# Patient Record
Sex: MALE | Race: WHITE | HISPANIC OR LATINO | Employment: FULL TIME | URBAN - METROPOLITAN AREA
[De-identification: names, ages, dates, MRNs, and addresses within clinical notes are randomized per-mention and may not be internally consistent; named-entity substitution may affect disease eponyms.]

---

## 2017-08-10 ENCOUNTER — OFFICE VISIT (OUTPATIENT)
Dept: URGENT CARE | Facility: CLINIC | Age: 22
End: 2017-08-10

## 2017-08-10 VITALS
SYSTOLIC BLOOD PRESSURE: 128 MMHG | BODY MASS INDEX: 25.11 KG/M2 | DIASTOLIC BLOOD PRESSURE: 82 MMHG | WEIGHT: 160 LBS | RESPIRATION RATE: 19 BRPM | TEMPERATURE: 98 F | HEIGHT: 67 IN | HEART RATE: 64 BPM | OXYGEN SATURATION: 99 %

## 2017-08-10 DIAGNOSIS — S61.311A: Primary | ICD-10-CM

## 2017-08-10 PROCEDURE — 17250 CHEM CAUT OF GRANLTJ TISSUE: CPT | Mod: S$GLB,,, | Performed by: PHYSICIAN ASSISTANT

## 2017-08-10 PROCEDURE — 99203 OFFICE O/P NEW LOW 30 MIN: CPT | Mod: S$GLB,,, | Performed by: PHYSICIAN ASSISTANT

## 2017-08-10 PROCEDURE — 3008F BODY MASS INDEX DOCD: CPT | Mod: S$GLB,,, | Performed by: PHYSICIAN ASSISTANT

## 2017-08-10 RX ORDER — MUPIROCIN 20 MG/G
OINTMENT TOPICAL
Qty: 22 G | Refills: 1 | Status: SHIPPED | OUTPATIENT
Start: 2017-08-10

## 2017-08-10 NOTE — PATIENT INSTRUCTIONS
Laceración, Yfn, Sin Sutura [Laceration: Old, Not Sutured]  Suzanne laceración es suzanne cortada a través de la piel. Si la cortada es profunda, generalmente requerirá puntos. Sin embargo, si suzanne laceración permanece abierta demasiado tiempo, aumenta el riesgo de infección. En novoa enrique, valenzuela pasado demasiado tiempo antes de venir a tratamiento. El peligro de infección por la sutura en ambar momento es muy alto. Por marianela razón novoa herida no recibió puntos.  Si la herida está completamente abierta se curará rellenándose desde el fondo y los lados. Suzanne herida sin sutura puede llevar de suzanne a cuatro semanas para curarse, dependiendo del tamaño de la herida. Probablemente quedará suzanne cicatriz. Más adelante, podrá hablar sobre un procedimiento para disimular la cicatriz con novoa proveedor de atención médica.  Cuidado En [place]  Los siguientes lineamientos lo ayudarán a cuidar de novoa laceración en novoa casa:  · Mantenga novoa herida limpia y seca. Si le aplicaron suzanne venda y se moja o ensucia, reemplácela. De otro modo, déjela en novoa lugar las primeras 24 horas, luego cámbielas suzanne vez al día elyse le indicaron.  · Limpie la herida diariamente:  ¨ Después de quitar la venda, lave la kathleen con agua y jabón. Use un hisopo de algodón (Q-tip) para aflojar y quitar la alan seca o las costras en la herida.  ¨ Hable con novoa médico antes de colocarse pomada antibiótica en la herida. Coloque suzanne nueva venda.  ¨ Puede quitarse la venda para ducharse después de 24 horas, andreas no sumerja la kathleen en agua (no se bañe ni nade) carson los próximos gume días. Evite las actividades que puedan hacer que se le vuelva a abrir la herida.  · El médico puede recetarle suzanne crema o pomada antibiótica para evitar suzanne infección. No deje de usarla hasta terminar el tratamiento indicado o hasta que el médico le diga que deje de usarla.  · El médico puede recetarle medicamentos para el dolor. Tómelos de acuerdo con las indicaciones del médico.  · Revise la herida a  diario para [place] si hay alguna de las señales de infección que figuran abajo.  Seguimiento  Programe abigail visita de control con novoa proveedor de atención médica según le haya indicado.  Busque Atención Médica  Llame de inmediato a novoa proveedor de atención médica si algo de lo siguiente ocurre:  · Sangrado que no para ejerciendo presión directa sobre la herida  · Señales de infección, incluidos aumento del dolor en la herida, enrojecimiento, hinchazón o pus o mal olor procedentes de la herida  · Fiebre de 100.4°F (38°C) o más fatuma, o elyse le haya indicado novoa proveedor de atención médica  · Los bordes de la herida se abren  · La herida cambia de color  · Adormecimiento alrededor de la herida  · Disminución de la movilidad en la kathleen herida  Date Last Reviewed: 6/10/2015  © 5612-9370 [employer]. [address] [place] [id number]. Todos los derechos reservados. Esta información no pretende sustituir la atención médica profesional. Sólo novoa médico puede diagnosticar y tratar un problema de ailin.        Amputación De La Punta Del Dedo [Finger Tip Amputation, Open Treatment]  Usted ha sufrido abigail amputación parcial o completa de la punta del dedo (la falange distal). Para novoa tipo de lesión, el mejor resultado puede obtenerse permitiendo que la herida se cure por sí twan a medida que la piel crece por ambos lados. Según cuál sea el tamaño de la herida, esta tardará 2-6 semanas en cubrirse de piel nueva. Abigail vez curada la herida, usted recuperará la sensibilidad normal en la nueva piel.  Cuidados En [place]:  Las siguientes recomendaciones le ayudarán a cuidar novoa herida en casa:  · Si se usó algún tipo de anestesia en el dedo, novoa efecto desaparecerá gradualmente en 1-6 horas. Luego, tome cualquier medicamento contra el dolor que le hayan recetado. Si no le recetaron nada, puede jennifer un analgésico de venta jennifer.  · Mantenga elevada la mano lesionada carson los primeros dos días para reducir la hinchazón  y el dolor.  · Mantenga el vendaje limpio y seco. Si el vendaje permanece puesto carson más de dos días, se pegará a la herida. A menos que tenga abigail raffaele con el médico en dos días, cambie el vendaje según se describe a continuación.  · Si le recetaron algún medicamento para evitar infecciones, tómelo de acuerdo con las indicaciones hasta que se acabe o le indiquen que deje de tomarlo.  · Si el vendaje se pega a la herida, puede despegarlo mojándolo dave con agua tibia.  · Abigail vez que haya quitado el vendaje, lave la herida con agua y jabón. Luego aplique abigail pomada antibiótica.  · [place] los primeros dos días debe usarse un vendaje o apósito grueso de gasa para gadiel protección adicional contra las lesiones. Cubra la herida con un abigail almohadilla de gasa no adhesiva o envuélvala con abigail venda de gasa. Después, si la herida es pequeña y no le duele demasiado, puede usar abigail venda larga y elástica.  · Puede ducharse elyse de costumbre, andreas mantenga el vendaje seco cubriendo la mano con abigail bolsa de plástico pequeña ajustada con abigail martin elástica alrededor de la daivd mientras se ducha. No moje la mano en agua (bañeras o piscinas) hasta que el proveedor de atención médica le diga que puede hacerlo.  Visitas De Control:  Programe abigail visita de control con novoa proveedor de atención médica según le haya indicado.  Busque Atención Médica  Llame de inmediato a novoa proveedor de atención médica si algo de lo siguiente ocurre:  · Sangrado que no puede controlarse aplicando presión directa  · Aumento del dolor en la herida  · Enrojecimiento o hinchazón en la kathleen de la herida  · Pus o supuración procedentes de la herida, o mal olor   · Fiebre de 100.4°F (38°C) o más fatuma, o elyse le haya indicado novoa proveedor de atención médica.  Date Last Reviewed: 6/14/2015 © 2000-2016 [email or web address] [address] [place] [address], [place] [address]. Todos los derechos reservados. Esta información no pretende sustituir la atención médica  profesional. Sólo novoa médico puede diagnosticar y tratar un problema de ailin.      Please follow up with your Primary care provider within 2-5 days if your signs ans symptoms have not resolved or worsen.     If your condition worsens or fails to improve we recommend that you receive another evaluation at the emergency room immediately or contact your primary medical clinic to discuss your concerns.   You must understand that you have received an Urgent Care treatment only and that you may be released before all of your medical problems are known or treated. You, the patient, will arrange for follow up care as instructed.                                                             Laceration Repair   If your condition worsens or fails to improve we recommend that you receive another evaluation at the ER immediately or contact your PCP to discuss your concerns or return here. You must understand that you've received an urgent care treatment only and that you may be released before all your medical problems are known or treated. You the patient will arrange for followup care as instructed.   Use the prescription antibiotic ointment for 2-3 days only. Clean the wound twice a day with betadiene and apply the ointment. After that, only use a dry bandage as the ointment will keep the laceration too moist.   Suture removal on face in 5 days   Suture removal on trunk or extremities in about 10 days.   Monitor for signs of infection such as redness, drainage, increase swelling or increase pain.   If you were given narcotics for pain do not drive or operate heavy equipment or machinery.   Tylenol or ibuprofen can also be used as directed for pain unless you have an allergy to them or medical condition such as stomach ulcers, kidney or liver disease or blood thinners etc for which you should not be taking these type of medications.

## 2017-08-10 NOTE — PROGRESS NOTES
"Subjective:       Patient ID: Rex Dumont is a 21 y.o. male.    Vitals:  height is 5' 7" (1.702 m) and weight is 72.6 kg (160 lb). His temperature is 97.9 °F (36.6 °C). His blood pressure is 128/82 and his pulse is 64. His respiration is 19 and oxygen saturation is 99%.     Chief Complaint: Laceration    Laceration    The incident occurred 6 to 12 hours ago. The laceration is located on the left hand. The laceration mechanism was a clean knife. The pain is at a severity of 0/10. The patient is experiencing no pain. He reports no foreign bodies present.     Review of Systems   Constitution: Negative for weakness and malaise/fatigue.   HENT: Negative for nosebleeds.    Cardiovascular: Negative for chest pain and syncope.   Respiratory: Negative for shortness of breath.    Hematologic/Lymphatic: Positive for bleeding problem.   Musculoskeletal: Negative for back pain, joint pain and neck pain.   Gastrointestinal: Negative for abdominal pain.   Genitourinary: Negative for hematuria.   Neurological: Negative for dizziness and numbness.        Pt has a headache       Objective:      Physical Exam   Constitutional: He is oriented to person, place, and time. He appears well-developed and well-nourished.   HENT:   Head: Normocephalic and atraumatic. Head is without abrasion, without contusion and without laceration.   Right Ear: External ear normal.   Left Ear: External ear normal.   Nose: Nose normal.   Mouth/Throat: Oropharynx is clear and moist.   Eyes: Conjunctivae, EOM and lids are normal. Pupils are equal, round, and reactive to light.   Neck: Trachea normal, full passive range of motion without pain and phonation normal. Neck supple.   Cardiovascular: Normal rate, regular rhythm and normal heart sounds.    Pulmonary/Chest: Effort normal and breath sounds normal. No stridor. No respiratory distress.   Musculoskeletal: Normal range of motion.   Neurological: He is alert and oriented to person, place, and time. "   Skin: Skin is warm and dry. Capillary refill takes less than 2 seconds. Laceration noted. No abrasion, no bruising, no burn, no ecchymosis, no lesion and no rash noted. No erythema.        Psychiatric: He has a normal mood and affect. His speech is normal and behavior is normal. Judgment and thought content normal. Cognition and memory are normal.   Nursing note and vitals reviewed.      Assessment:       1. Laceration of left index finger w/o foreign body with damage to nail        Plan:         Laceration of left index finger w/o foreign body with damage to nail                                                              Laceration Repair   If your condition worsens or fails to improve we recommend that you receive another evaluation at the ER immediately or contact your PCP to discuss your concerns or return here. You must understand that you've received an urgent care treatment only and that you may be released before all your medical problems are known or treated. You the patient will arrange for followup care as instructed.   Use the prescription antibiotic ointment for 2-3 days only. Clean the wound twice a day with betadiene and apply the ointment. After that, only use a dry bandage as the ointment will keep the laceration too moist.   Suture removal on face in 5 days   Suture removal on trunk or extremities in about 10 days.   Monitor for signs of infection such as redness, drainage, increase swelling or increase pain.   If you were given narcotics for pain do not drive or operate heavy equipment or machinery.   Tylenol or ibuprofen can also be used as directed for pain unless you have an allergy to them or medical condition such as stomach ulcers, kidney or liver disease or blood thinners etc for which you should not be taking these type of medications.

## 2017-08-10 NOTE — PROCEDURES
Procedures     Wound Repair     Verbal consent obtained  Prior to procedure correct patient, procedure, and site verfied.  Location: Left index phalynx medial tip  Simple partial amp about 2mmx 3mm  Contamination: none   Foreign bodies:None  No tendon involvement  No nerve involvement  Actively bleeding    Procedure details:  Irrigated with Saline 80 mLs  Sterile fashion utilized.   Local anesthesia: Topical lidocaine 1% without epi used.   Prepped with betadine  Chem cautery used with silver nitrate  Blood loss in 1 mLs  Cleansed and bandaged.  Pt tolerated procedure well.

## 2019-06-27 ENCOUNTER — OFFICE VISIT (OUTPATIENT)
Dept: URGENT CARE | Facility: CLINIC | Age: 24
End: 2019-06-27

## 2019-06-27 VITALS
DIASTOLIC BLOOD PRESSURE: 83 MMHG | OXYGEN SATURATION: 100 % | HEIGHT: 67 IN | TEMPERATURE: 98 F | BODY MASS INDEX: 28.56 KG/M2 | SYSTOLIC BLOOD PRESSURE: 122 MMHG | HEART RATE: 58 BPM | RESPIRATION RATE: 18 BRPM | WEIGHT: 182 LBS

## 2019-06-27 DIAGNOSIS — R10.31 RIGHT LOWER QUADRANT ABDOMINAL PAIN: Primary | ICD-10-CM

## 2019-06-27 PROCEDURE — 99214 PR OFFICE/OUTPT VISIT, EST, LEVL IV, 30-39 MIN: ICD-10-PCS | Mod: S$GLB,,, | Performed by: NURSE PRACTITIONER

## 2019-06-27 PROCEDURE — 74019 RADEX ABDOMEN 2 VIEWS: CPT | Mod: FY,TIER,S$GLB, | Performed by: RADIOLOGY

## 2019-06-27 PROCEDURE — 99214 OFFICE O/P EST MOD 30 MIN: CPT | Mod: S$GLB,,, | Performed by: NURSE PRACTITIONER

## 2019-06-27 PROCEDURE — 74019 XR ABDOMEN FLAT AND ERECT: ICD-10-PCS | Mod: FY,TIER,S$GLB, | Performed by: RADIOLOGY

## 2019-06-27 NOTE — PROGRESS NOTES
"Subjective:       Patient ID: Rex Dumont is a 23 y.o. male.    Vitals:  height is 5' 7" (1.702 m) and weight is 82.6 kg (182 lb). His oral temperature is 97.5 °F (36.4 °C). His blood pressure is 122/83 and his pulse is 58 (abnormal). His respiration is 18 and oxygen saturation is 100%.     Chief Complaint: Flank Pain (right side)    Abdominal Pain   This is a new problem. Episode onset: 1 week. The onset quality is sudden. The problem occurs intermittently. The problem has been waxing and waning. The pain is located in the RLQ. The pain is at a severity of 6/10. The pain is moderate. The quality of the pain is sharp (palpation and walking hurts). The abdominal pain does not radiate. Associated symptoms include myalgias. Pertinent negatives include no arthralgias, diarrhea, frequency, nausea or vomiting. The pain is aggravated by palpation and movement. He has tried nothing for the symptoms.       Constitution: Negative for chills and fatigue.   HENT: Negative for congestion and sore throat.    Neck: Negative for painful lymph nodes.   Cardiovascular: Negative for leg swelling.   Eyes: Negative for double vision and blurred vision.   Respiratory: Negative for cough and shortness of breath.    Gastrointestinal: Positive for abdominal pain. Negative for nausea, vomiting and diarrhea.   Genitourinary: Negative for frequency and urgency.   Musculoskeletal: Positive for muscle ache. Negative for joint pain, joint swelling and muscle cramps.   Skin: Negative for color change, pale and rash.   Allergic/Immunologic: Negative for seasonal allergies.   Neurological: Negative for dizziness, history of vertigo, light-headedness and passing out.   Hematologic/Lymphatic: Negative for swollen lymph nodes, easy bruising/bleeding and history of blood clots. Does not bruise/bleed easily.   Psychiatric/Behavioral: Negative for nervous/anxious, sleep disturbance and depression. The patient is not nervous/anxious.      "   Objective:      Physical Exam   Constitutional: He is oriented to person, place, and time. Vital signs are normal. He appears well-developed and well-nourished. He is active and cooperative.  Non-toxic appearance. He does not have a sickly appearance. He does not appear ill. No distress.   HENT:   Head: Normocephalic and atraumatic.   Right Ear: External ear normal.   Left Ear: External ear normal.   Nose: Nose normal.   Mouth/Throat: Mucous membranes are normal.   Eyes: Conjunctivae, EOM and lids are normal.   Neck: Trachea normal and full passive range of motion without pain. Neck supple.   Cardiovascular: Normal rate, regular rhythm and normal heart sounds.   Pulses:       Radial pulses are 2+ on the right side, and 2+ on the left side.   Pulmonary/Chest: Effort normal and breath sounds normal. No respiratory distress.   Abdominal: Soft. Normal appearance and bowel sounds are normal. He exhibits no distension, no abdominal bruit, no pulsatile midline mass and no mass. There is tenderness (mild) in the right lower quadrant. There is no rigidity, no rebound, no guarding, no CVA tenderness, no tenderness at McBurney's point and negative Wong's sign.       Musculoskeletal: Normal range of motion. He exhibits no edema.   Neurological: He is alert and oriented to person, place, and time. He has normal strength. Gait normal.   Skin: Skin is warm, dry and intact. Capillary refill takes less than 2 seconds. No rash noted. He is not diaphoretic. No pallor.   Psychiatric: He has a normal mood and affect. His speech is normal and behavior is normal. Judgment and thought content normal. Cognition and memory are normal.   Nursing note and vitals reviewed.      Assessment:       1. Right lower quadrant abdominal pain        Plan:       Abdomen soft with very mild tenderness noted to the right lower quadrant.  Bowel sounds active in all 4 quadrants.  Patient reports pain exacerbate when he strains while trying to have a  bowel movement. Pain noted to be intermittent.  Advised patient to stop straining.  Increase fiber in diet.  May take laxative over-the-counter as needed for constipation.  Abdominal ER precautions discussed.  Advised to follow-up primary care provider for further evaluation.    Right lower quadrant abdominal pain  -     XR ABDOMEN FLAT AND ERECT; Future; Expected date: 06/27/2019      Patient Instructions   May take laxative over-the-counter as needed for constipation such as MiraLax or magnesium citrate.    Go to the Emergency Department for any new or worsening symptoms including: worsening abdominal pain, dark\black\bloody bowel movements, vomiting blood, hard abdomen, fever, chest pain, shortness of breath, loss of consciousness or any other concerns.    If you were prescribed a narcotic or controlled medication, do not drive or operate heavy equipment or machinery while taking these medications.    You must understand that you've received an Urgent Care treatment only and that you may be released before all your medical problems are known or treated. You, the patient, will arrange for follow up care as instructed.    Follow up with your PCP or specialty clinic as directed within 2-5 days if not improved or as needed.  You can call [phone] to schedule an appointment with the appropriate provider.      Dolor Abdominalcausa Desconocida [Male]  De acuerdo a novoa visita hoy, no es maria alejandra la causa exacta del dolor abdominal que usted tiene. Novoa condición no parece ser seria en ambar momento. Sin embargo, las señales de abigail condición más seria pueden jennifer más tiempo en aparecer. Aunque la evaluación fue tranquilizadora hoy, a veces en el curso de muchas enfermedades, exámenes y pruebas de laboratorio pueden parecer normales. Por esta razón, es importante que usted esté pendiente de nuevos síntomas o si novoa condición se empeora.  Causas  : Puede que no sea obvio lo que causó bertha síntomas. Preste atención a las cosas  que parecen hacer que bertha síntomas empeoren o mejoren y consulte con novoa médico para novoa seguimiento.  Diagnóstico  : La evaluación del dolor abdominal en el departamento de emergencia sólo puede requerir un examen por el médico, o puede incluir análisis de alan, orina o estudios de imagen, dependiendo de muchos factores. A veces, los exámenes y las pruebas pueden identificar abigail causa, andreas en muchos casos, no se encuentra abigail causa maria alejandra. Más pruebas en las visitas de seguimiento pueden ayudar a sugerir un diagnóstico kenn.  Cuidado En [place]:  · Descanse hasta que se sienta mejor.  · Trate de evitar cualquier medicamento (a menos que se lo diga novoa médico), comidas, actividades, u otros factores que pueden bryce contribuido a los síntomas.  · Trate de comer alimentos que usted sabe que valenzuela tolerado dave en el pasado. Algunas dietas pueden ser recomendados para algunas condiciones que causan dolor abdominal. Sin embargo, puesto que la causa de bertha síntomas pueden no ser jude, sobre novoa dieta más con novoa médico de cabecera o especialista para obtener más recomendaciones.  · [place] varias comidas pequeñas al día en lugar de 2 o 3 comidas grandes pueden ayudar.  · Observe con cuidado cualquier cosa que pueda hacer que bertha síntomas empeoren o mejores. Preste mucha atención a los siguientes síntomas que pueden indicar empeoramiento de novoa condición.  Seguimiento Y Precauciones:  consulte con novoa doctor o en esta institución según las instrucciones dadas si bertha síntomas no mejoran. En algunos casos, es posible que necesite más pruebas.  Comuníquese Con Novoa Médico O Busque Prontamente Atención Médica  si algo de lo siguiente ocurre:  · El dolor aumenta  · No puede jennifer bertha medicamentos debido al vómito excesivo  · Hinchazón del abdomen.  · Fiebre de 100.4°F (38°C) o más fatuma, o elyse le haya indicado novoa proveedor de atención médica  · Alan en el vómito o en la materia fecal (de color negruzco o rojizo oscuro).  · Debilidad,  mareo o benitezo.  · Dolor en el pecho, brazo, espalda, robin o mandíbula  Date Last Reviewed: 12/30/2015  © 5520-6767 [facility]. [address] [place] [address], [place] [address]. Todos los derechos reservados. Esta información no pretende sustituir la atención médica profesional. Sólo novoa médico puede diagnosticar y tratar un problema de ailin.        High-Fiber Diet  Fiber is in fruits, vegetables, cereals, and grains. Fiber passes through your body undigested. A high-fiber diet helps food move through your intestinal tract. The added bulk is helpful in preventing constipation. In people with diverticulosis, fiber helps clean out the pouches along the colon wall. It also prevents new pouches from forming. A high-fiber diet reduces the risk of colon cancer. It also lowers blood cholesterol and prevents high blood sugar in people with diabetes.    The fiber-rich foods listed below should be part of your diet. If you are not used to high-fiber foods, start with 1 or 2 foods from this list. Every 3 to 4 days add a new one to your diet. Do this until you are eating 4 high-fiber foods per day. This should give you 20 to 35 grams of fiber a day. It is also important to drink a lot of water when you are on this diet. You should have 6 to 8 glasses of water a day. Water makes the fiber swell and increases the benefit.  Foods high in dietary fiber  The following foods are high in dietary fiber:  · Breads. Breads made with 100% whole-wheat flour; kae, wheat, or rye crackers; whole-grain tortillas, bran muffins.  · Cereals. Whole-grain and bran cereals with bran (shredded wheat, wheat flakes, raisin bran, corn bran); oatmeal, rolled oats, granola, and brown rice.  · Fruits. Fresh fruits and their edible skins (pears, prunes, raisins, berries, apples, and apricots); bananas, citrus fruit, mangoes, pineapple; and prune juice.  · Nuts. Any nuts and seeds.  · Vegetables. Best served raw or lightly cooked. All types,  especially: green peas, celery, eggplant, potatoes, spinach, broccoli, [place] sprouts, winter squash, carrots, cauliflower, soybeans, lentils, and fresh and dried beans of all kinds.  · Other. Popcorn, any spices.  Date Last Reviewed: 8/1/2016 © 2000-2017 [employer]. [address] [place] [address]. All rights reserved. This information is not intended as a substitute for professional medical care. Always follow your healthcare professional's instructions.        Dolor Abdominalcausa Desconocida [Male]  De acuerdo a novoa visita hoy, no es maria alejandra la causa exacta del dolor abdominal que usted tiene. Novoa condición no parece ser seria en ambar momento. Sin embargo, las señales de abigail condición más seria pueden jennifer más tiempo en aparecer. Aunque la evaluación fue tranquilizadora holópez, a veces en el curso de muchas enfermedades, exámenes y pruebas de laboratorio pueden parecer normales. Por esta razón, es importante que usted esté pendiente de nuevos síntomas o si novoa condición se empeora.  Causas  : Puede que no sea obvio lo que causó bertha síntomas. Preste atención a las cosas que parecen hacer que bertha síntomas empeoren o mejoren y consulte con novoa médico para novoa seguimiento.  Diagnóstico  : La evaluación del dolor abdominal en el departamento de emergencia sólo puede requerir un examen por el médico, o puede incluir análisis de alan, orina o estudios de imagen, dependiendo de muchos factores. A veces, los exámenes y las pruebas pueden identificar abigail causa, andreas en muchos casos, no se encuentra abigail causa maria alejandra. Más pruebas en las visitas de seguimiento pueden ayudar a sugerir un diagnóstico kenn.  Cuidado En [place]:  · Descanse hasta que se sienta mejor.  · Trate de evitar cualquier medicamento (a menos que se lo diga novoa médico), comidas, actividades, u otros factores que pueden bryce contribuido a los síntomas.  · Trate de comer alimentos que usted sabe que valenzuela tolerado dave en el pasado. Algunas  dietas pueden ser recomendados para algunas condiciones que causan dolor abdominal. Sin embargo, puesto que la causa de bertha síntomas pueden no ser jude, sobre novoa dieta más con novoa médico de cabecera o especialista para obtener más recomendaciones.  · [place] varias comidas pequeñas al día en lugar de 2 o 3 comidas grandes pueden ayudar.  · Observe con cuidado cualquier cosa que pueda hacer que bertha síntomas empeoren o mejores. Preste mucha atención a los siguientes síntomas que pueden indicar empeoramiento de novoa condición.  Seguimiento Y Precauciones:  consulte con novoa doctor o en esta institución según las instrucciones dadas si bertha síntomas no mejoran. En algunos casos, es posible que necesite más pruebas.  Comuníquese Con Novoa Médico O Busque Prontamente Atención Médica  si algo de lo siguiente ocurre:  · El dolor aumenta  · No puede jennifer bertha medicamentos debido al vómito excesivo  · Hinchazón del abdomen.  · Fiebre de 100.4°F (38°C) o más fatuma, o elyse le haya indicado novoa proveedor de atención médica  · [place] en el vómito o en la materia fecal (de color negruzco o rojizo oscuro).  · Debilidad, mareo o desmayo.  · Dolor en el pecho, brazo, espalda, robin o mandíbula  Date Last Reviewed: 12/30/2015  © 3489-5850 [employer]. [address] [place] [address]. Todos los derechos reservados. Esta información no pretende sustituir la atención médica profesional. Sólo novoa médico puede diagnosticar y tratar un problema de ailin.        High-Fiber Diet  Fiber is in fruits, vegetables, cereals, and grains. Fiber passes through your body undigested. A high-fiber diet helps food move through your intestinal tract. The added bulk is helpful in preventing constipation. In people with diverticulosis, fiber helps clean out the pouches along the colon wall. It also prevents new pouches from forming. A high-fiber diet reduces the risk of colon cancer. It also lowers blood cholesterol and prevents high blood  sugar in people with diabetes.    The fiber-rich foods listed below should be part of your diet. If you are not used to high-fiber foods, start with 1 or 2 foods from this list. Every 3 to 4 days add a new one to your diet. Do this until you are eating 4 high-fiber foods per day. This should give you 20 to 35 grams of fiber a day. It is also important to drink a lot of water when you are on this diet. You should have 6 to 8 glasses of water a day. Water makes the fiber swell and increases the benefit.  Foods high in dietary fiber  The following foods are high in dietary fiber:  · Breads. Breads made with 100% whole-wheat flour; kae, wheat, or rye crackers; whole-grain tortillas, bran muffins.  · Cereals. Whole-grain and bran cereals with bran (shredded wheat, wheat flakes, raisin bran, corn bran); oatmeal, rolled oats, granola, and brown rice.  · Fruits. Fresh fruits and their edible skins (pears, prunes, raisins, berries, apples, and apricots); bananas, citrus fruit, mangoes, pineapple; and prune juice.  · Nuts. Any nuts and seeds.  · Vegetables. Best served raw or lightly cooked. All types, especially: green peas, celery, eggplant, potatoes, spinach, broccoli, [place] sprouts, winter squash, carrots, cauliflower, soybeans, lentils, and fresh and dried beans of all kinds.  · Other. Popcorn, any spices.  Date Last Reviewed: 8/1/2016  © 3279-7940 [employer]. [address] [place] [address], [place] [address]. All rights reserved. This information is not intended as a substitute for professional medical care. Always follow your healthcare professional's instructions.        Treating Constipation    Constipation is a common and often uncomfortable problem. Constipation means you have bowel movements fewer than 3 times per week, or strain to pass hard, dry stool. It can last a short time. Or it can be a problem that never seems to go away. The good news is that it can often be treated and controlled.  Eat more  fiber  One of the best ways to help treat constipation is to increase your fiber intake. You can do this either through diet or by using fiber supplements. Fiber (in whole grains, fruits, and vegetables) adds bulk and absorbs water to soften the stool. This helps the stool pass through the colon more easily. When you increase your fiber intake, do it slowly to avoid side effects such as bloating. Also increase the amount of water that you drink. Eating more of the following foods can add fiber to your diet.  · High-fiber cereals  · Whole grains, bran, and brown rice  · Vegetables such as carrots, broccoli, and greens  · Fresh fruits (especially apples, pears, and dried fruits like raisins and apricots)  · Nuts and legumes (especially beans such as lentils, kidney beans, and lima beans)  Get physically active  Exercise helps improve the working of your colon which helps ease constipation. Try to get some physical activity every day. If you havent been active for a while, talk to your healthcare provider before starting again.  Laxatives  Your healthcare provider may suggest an over-the-counter product to help ease your constipation. He or she may suggest the use of bulk-forming agents or laxatives. The use of laxatives, if used as directed, is common and safe. Follow directions carefully when using them. See your healthcare provider for new-onset constipation, or long-term constipation, to rule out other causes such as medicines or thyroid disease.  Date Last Reviewed: 7/1/2016  © 6331-1830 [employer], [employer]. [address] [place] [address], [place] [address]. All rights reserved. This information is not intended as a substitute for professional medical care. Always follow your healthcare professional's instructions.

## 2019-06-27 NOTE — PATIENT INSTRUCTIONS
May take laxative over-the-counter as needed for constipation such as MiraLax or magnesium citrate.    Go to the Emergency Department for any new or worsening symptoms including: worsening abdominal pain, dark\black\bloody bowel movements, vomiting blood, hard abdomen, fever, chest pain, shortness of breath, loss of consciousness or any other concerns.    If you were prescribed a narcotic or controlled medication, do not drive or operate heavy equipment or machinery while taking these medications.    You must understand that you've received an Urgent Care treatment only and that you may be released before all your medical problems are known or treated. You, the patient, will arrange for follow up care as instructed.    Follow up with your PCP or specialty clinic as directed within 2-5 days if not improved or as needed.  You can call [phone] to schedule an appointment with the appropriate provider.      Dolor Abdominalcausa Desconocida [Male]  De acuerdo a novoa visita hoy, no es maria alejandra la causa exacta del dolor abdominal que usted tiene. Novoa condición no parece ser seria en ambar momento. Sin embargo, las señales de abigail condición más seria pueden jennifer más tiempo en aparecer. Aunque la evaluación fue tranquilizadora hoy, a veces en el curso de muchas enfermedades, exámenes y pruebas de laboratorio pueden parecer normales. Por esta razón, es importante que usted esté pendiente de nuevos síntomas o si novoa condición se empeora.  Causas  : Puede que no sea obvio lo que causó bertha síntomas. Preste atención a las cosas que parecen hacer que bertha síntomas empeoren o mejoren y consulte con novoa médico para novoa seguimiento.  Diagnóstico  : La evaluación del dolor abdominal en el departamento de emergencia sólo puede requerir un examen por el médico, o puede incluir análisis de alan, orina o estudios de imagen, dependiendo de muchos factores. A veces, los exámenes y las pruebas pueden identificar abigail causa, andreas en muchos casos,  no se encuentra abigail causa maria alejandra. Más pruebas en las visitas de seguimiento pueden ayudar a sugerir un diagnóstico kenn.  Cuidado En [place]:  · Descanse hasta que se sienta mejor.  · Trate de evitar cualquier medicamento (a menos que se lo diga novoa médico), comidas, actividades, u otros factores que pueden bryce contribuido a los síntomas.  · Trate de comer alimentos que usted sabe que valenzuela tolerado dave en el pasado. Algunas dietas pueden ser recomendados para algunas condiciones que causan dolor abdominal. Sin embargo, puesto que la causa de bertha síntomas pueden no ser jude, sobre novoa dieta más con novoa médico de cabecera o especialista para obtener más recomendaciones.  · [place] varias comidas pequeñas al día en lugar de 2 o 3 comidas grandes pueden ayudar.  · Observe con cuidado cualquier cosa que pueda hacer que bertha síntomas empeoren o mejores. Preste mucha atención a los siguientes síntomas que pueden indicar empeoramiento de novoa condición.  Seguimiento Y Precauciones:  consulte con novoa doctor o en esta institución según las instrucciones dadas si bertha síntomas no mejoran. En algunos casos, es posible que necesite más pruebas.  Comuníquese Con Novoa Médico O Busque Prontamente Atención Médica  si algo de lo siguiente ocurre:  · El dolor aumenta  · No puede jennifer bertha medicamentos debido al vómito excesivo  · Hinchazón del abdomen.  · Fiebre de 100.4°F (38°C) o más fatuma, o elyse le haya indicado novoa proveedor de atención médica  · Tripp en el vómito o en la materia fecal (de color negruzco o rojizo oscuro).  · Debilidad, mareo o desmayo.  · Dolor en el pecho, mars, paradise, robin o shiva  Date Last Reviewed: 12/30/2015  © 4419-1995 [employer]. [address] [place] [address]. Todos los derechos reservados. Esta información no pretende sustituir la atención médica profesional. Sólo novoa médico puede diagnosticar y tratar un problema de ailin.        High-Fiber Diet  Fiber is in fruits, vegetables,  cereals, and grains. Fiber passes through your body undigested. A high-fiber diet helps food move through your intestinal tract. The added bulk is helpful in preventing constipation. In people with diverticulosis, fiber helps clean out the pouches along the colon wall. It also prevents new pouches from forming. A high-fiber diet reduces the risk of colon cancer. It also lowers blood cholesterol and prevents high blood sugar in people with diabetes.    The fiber-rich foods listed below should be part of your diet. If you are not used to high-fiber foods, start with 1 or 2 foods from this list. Every 3 to 4 days add a new one to your diet. Do this until you are eating 4 high-fiber foods per day. This should give you 20 to 35 grams of fiber a day. It is also important to drink a lot of water when you are on this diet. You should have 6 to 8 glasses of water a day. Water makes the fiber swell and increases the benefit.  Foods high in dietary fiber  The following foods are high in dietary fiber:  · Breads. Breads made with 100% whole-wheat flour; kae, wheat, or rye crackers; whole-grain tortillas, bran muffins.  · Cereals. Whole-grain and bran cereals with bran (shredded wheat, wheat flakes, raisin bran, corn bran); oatmeal, rolled oats, granola, and brown rice.  · Fruits. Fresh fruits and their edible skins (pears, prunes, raisins, berries, apples, and apricots); bananas, citrus fruit, mangoes, pineapple; and prune juice.  · Nuts. Any nuts and seeds.  · Vegetables. Best served raw or lightly cooked. All types, especially: green peas, celery, eggplant, potatoes, spinach, broccoli, [place] sprouts, winter squash, carrots, cauliflower, soybeans, lentils, and fresh and dried beans of all kinds.  · Other. Popcorn, any spices.  Date Last Reviewed: 8/1/2016  © 6952-2519 [employer]. [address] [place] [address], [place] [address]. All rights reserved. This information is not intended as a substitute for  professional medical care. Always follow your healthcare professional's instructions.        Dolor Abdominalcausa Desconocida [Male]  De acuerdo a novoa visita holópez, no es maria alejandra la causa exacta del dolor abdominal que usted tiene. Novoa condición no parece ser seria en ambar momento. Sin embargo, las señales de abigail condición más seria pueden jennifer más tiempo en aparecer. Aunque la evaluación fue tranquilizadora stephan, a veces en el curso de muchas enfermedades, exámenes y pruebas de laboratorio pueden parecer normales. Por esta razón, es importante que usted esté pendiente de nuevos síntomas o si novoa condición se empeora.  Causas  : Puede que no sea obvio lo que causó bertha síntomas. Preste atención a las cosas que parecen hacer que bertha síntomas empeoren o mejoren y consulte con novoa médico para novoa seguimiento.  Diagnóstico  : La evaluación del dolor abdominal en el departamento de emergencia sólo puede requerir un examen por el médico, o puede incluir análisis de alan, orina o estudios de imagen, dependiendo de muchos factores. A veces, los exámenes y las pruebas pueden identificar abigail causa, andreas en muchos casos, no se encuentra abigail causa maria alejandra. Más pruebas en las visitas de seguimiento pueden ayudar a sugerir un diagnóstico kenn.  Cuidado En [place]:  · Descanse hasta que se sienta mejor.  · Trate de evitar cualquier medicamento (a menos que se lo diga novoa médico), comidas, actividades, u otros factores que pueden bryce contribuido a los síntomas.  · Trate de comer alimentos que usted sabe que valenzuela tolerado dave en el pasado. Algunas dietas pueden ser recomendados para algunas condiciones que causan dolor abdominal. Sin embargo, puesto que la causa de bertha síntomas pueden no ser jude, sobre novoa dieta más con novoa médico de cabecera o especialista para obtener más recomendaciones.  · [place] varias comidas pequeñas al día en lugar de 2 o 3 comidas grandes pueden ayudar.  · Observe con cuidado cualquier cosa que pueda hacer que bertha  síntomas empeoren o mejores. Preste mucha atención a los siguientes síntomas que pueden indicar empeoramiento de novoa condición.  Seguimiento Y Precauciones:  consulte con novoa doctor o en esta institución según las instrucciones dadas si bertha síntomas no mejoran. En algunos casos, es posible que necesite más pruebas.  Comuníquese Con Novoa Médico O Busque Prontamente Atención Médica  si algo de lo siguiente ocurre:  · El dolor aumenta  · No puede jennifer bertha medicamentos debido al vómito excesivo  · Hinchazón del abdomen.  · Fiebre de 100.4°F (38°C) o más fatuma, o elyse le haya indicado novoa proveedor de atención médica  · Tripp en el vómito o en la materia fecal (de color negruzco o rojizo oscuro).  · Debilidad, mareo o desmayo.  · Dolor en el pecho, brazo, espalda, robin o mandíbula  Date Last Reviewed: 12/30/2015  © 1871-8463 [employer]. [address] [place] [address]. Todos los derechos reservados. Esta información no pretende sustituir la atención médica profesional. Sólo novoa médico puede diagnosticar y tratar un problema de ailin.        High-Fiber Diet  Fiber is in fruits, vegetables, cereals, and grains. Fiber passes through your body undigested. A high-fiber diet helps food move through your intestinal tract. The added bulk is helpful in preventing constipation. In people with diverticulosis, fiber helps clean out the pouches along the colon wall. It also prevents new pouches from forming. A high-fiber diet reduces the risk of colon cancer. It also lowers blood cholesterol and prevents high blood sugar in people with diabetes.    The fiber-rich foods listed below should be part of your diet. If you are not used to high-fiber foods, start with 1 or 2 foods from this list. Every 3 to 4 days add a new one to your diet. Do this until you are eating 4 high-fiber foods per day. This should give you 20 to 35 grams of fiber a day. It is also important to drink a lot of water when you are on this diet.  You should have 6 to 8 glasses of water a day. Water makes the fiber swell and increases the benefit.  Foods high in dietary fiber  The following foods are high in dietary fiber:  · Breads. Breads made with 100% whole-wheat flour; kae, wheat, or rye crackers; whole-grain tortillas, bran muffins.  · Cereals. Whole-grain and bran cereals with bran (shredded wheat, wheat flakes, raisin bran, corn bran); oatmeal, rolled oats, granola, and brown rice.  · Fruits. Fresh fruits and their edible skins (pears, prunes, raisins, berries, apples, and apricots); bananas, citrus fruit, mangoes, pineapple; and prune juice.  · Nuts. Any nuts and seeds.  · Vegetables. Best served raw or lightly cooked. All types, especially: green peas, celery, eggplant, potatoes, spinach, broccoli, [place] sprouts, winter squash, carrots, cauliflower, soybeans, lentils, and fresh and dried beans of all kinds.  · Other. Popcorn, any spices.  Date Last Reviewed: 8/1/2016  © 8782-2678 [employer]. [address] [place] [address], [place] [address]. All rights reserved. This information is not intended as a substitute for professional medical care. Always follow your healthcare professional's instructions.        Treating Constipation    Constipation is a common and often uncomfortable problem. Constipation means you have bowel movements fewer than 3 times per week, or strain to pass hard, dry stool. It can last a short time. Or it can be a problem that never seems to go away. The good news is that it can often be treated and controlled.  Eat more fiber  One of the best ways to help treat constipation is to increase your fiber intake. You can do this either through diet or by using fiber supplements. Fiber (in whole grains, fruits, and vegetables) adds bulk and absorbs water to soften the stool. This helps the stool pass through the colon more easily. When you increase your fiber intake, do it slowly to avoid side effects such as bloating. Also  increase the amount of water that you drink. Eating more of the following foods can add fiber to your diet.  · High-fiber cereals  · Whole grains, bran, and brown rice  · Vegetables such as carrots, broccoli, and greens  · Fresh fruits (especially apples, pears, and dried fruits like raisins and apricots)  · Nuts and legumes (especially beans such as lentils, kidney beans, and lima beans)  Get physically active  Exercise helps improve the working of your colon which helps ease constipation. Try to get some physical activity every day. If you havent been active for a while, talk to your healthcare provider before starting again.  Laxatives  Your healthcare provider may suggest an over-the-counter product to help ease your constipation. He or she may suggest the use of bulk-forming agents or laxatives. The use of laxatives, if used as directed, is common and safe. Follow directions carefully when using them. See your healthcare provider for new-onset constipation, or long-term constipation, to rule out other causes such as medicines or thyroid disease.  Date Last Reviewed: 7/1/2016 © 2000-2017 [email or web address]. [address] [place] [address], [place] [address]. All rights reserved. This information is not intended as a substitute for professional medical care. Always follow your healthcare professional's instructions.

## 2019-06-30 ENCOUNTER — TELEPHONE (OUTPATIENT)
Dept: URGENT CARE | Facility: CLINIC | Age: 24
End: 2019-06-30

## 2024-06-11 ENCOUNTER — ANESTHESIA EVENT (OUTPATIENT)
Dept: ENDOSCOPY | Facility: HOSPITAL | Age: 29
End: 2024-06-11

## 2024-06-11 ENCOUNTER — ANESTHESIA (OUTPATIENT)
Dept: ENDOSCOPY | Facility: HOSPITAL | Age: 29
End: 2024-06-11

## 2024-06-11 ENCOUNTER — HOSPITAL ENCOUNTER (INPATIENT)
Facility: HOSPITAL | Age: 29
LOS: 1 days | Discharge: HOME | DRG: 378 | End: 2024-06-12
Attending: STUDENT IN AN ORGANIZED HEALTH CARE EDUCATION/TRAINING PROGRAM | Admitting: STUDENT IN AN ORGANIZED HEALTH CARE EDUCATION/TRAINING PROGRAM

## 2024-06-11 DIAGNOSIS — R07.9 CHEST PAIN: ICD-10-CM

## 2024-06-11 DIAGNOSIS — K92.2 UPPER GI BLEED: ICD-10-CM

## 2024-06-11 PROBLEM — D62 ANEMIA DUE TO ACUTE BLOOD LOSS: Status: ACTIVE | Noted: 2024-06-11

## 2024-06-11 LAB
ABO + RH BLD: NORMAL
ALBUMIN SERPL BCP-MCNC: 2.9 G/DL (ref 3.5–5.2)
ALP SERPL-CCNC: 40 U/L (ref 55–135)
ALT SERPL W/O P-5'-P-CCNC: 9 U/L (ref 10–44)
ANION GAP SERPL CALC-SCNC: 7 MMOL/L (ref 8–16)
AST SERPL-CCNC: 12 U/L (ref 10–40)
BILIRUB SERPL-MCNC: 0.8 MG/DL (ref 0.1–1)
BLD GP AB SCN CELLS X3 SERPL QL: NORMAL
BLD PROD TYP BPU: NORMAL
BLOOD UNIT EXPIRATION DATE: NORMAL
BLOOD UNIT TYPE CODE: 5100
BLOOD UNIT TYPE: NORMAL
BUN SERPL-MCNC: 8 MG/DL (ref 6–20)
CALCIUM SERPL-MCNC: 8.1 MG/DL (ref 8.7–10.5)
CHLORIDE SERPL-SCNC: 108 MMOL/L (ref 95–110)
CO2 SERPL-SCNC: 22 MMOL/L (ref 23–29)
CODING SYSTEM: NORMAL
CREAT SERPL-MCNC: 0.9 MG/DL (ref 0.5–1.4)
CROSSMATCH INTERPRETATION: NORMAL
DISPENSE STATUS: NORMAL
ERYTHROCYTE [DISTWIDTH] IN BLOOD BY AUTOMATED COUNT: 17.2 % (ref 11.5–14.5)
EST. GFR  (NO RACE VARIABLE): >60 ML/MIN/1.73 M^2
GLUCOSE SERPL-MCNC: 142 MG/DL (ref 70–110)
HCT VFR BLD AUTO: 19.4 % (ref 40–54)
HGB BLD-MCNC: 6.5 G/DL (ref 14–18)
HGB BLD-MCNC: 7.4 G/DL (ref 14–18)
INR PPP: 1 (ref 0.8–1.2)
MAGNESIUM SERPL-MCNC: 1.8 MG/DL (ref 1.6–2.6)
MCH RBC QN AUTO: 30.5 PG (ref 27–31)
MCHC RBC AUTO-ENTMCNC: 33.5 G/DL (ref 32–36)
MCV RBC AUTO: 91 FL (ref 82–98)
PLATELET # BLD AUTO: 214 K/UL (ref 150–450)
PMV BLD AUTO: 10.5 FL (ref 9.2–12.9)
POTASSIUM SERPL-SCNC: 4.2 MMOL/L (ref 3.5–5.1)
PROT SERPL-MCNC: 4.8 G/DL (ref 6–8.4)
PROTHROMBIN TIME: 10.9 SEC (ref 9–12.5)
RBC # BLD AUTO: 2.13 M/UL (ref 4.6–6.2)
SODIUM SERPL-SCNC: 137 MMOL/L (ref 136–145)
SPECIMEN OUTDATE: NORMAL
TRANS ERYTHROCYTES VOL PATIENT: NORMAL ML
WBC # BLD AUTO: 14.73 K/UL (ref 3.9–12.7)

## 2024-06-11 PROCEDURE — 85018 HEMOGLOBIN: CPT | Performed by: INTERNAL MEDICINE

## 2024-06-11 PROCEDURE — 86900 BLOOD TYPING SEROLOGIC ABO: CPT | Performed by: STUDENT IN AN ORGANIZED HEALTH CARE EDUCATION/TRAINING PROGRAM

## 2024-06-11 PROCEDURE — 30233N1 TRANSFUSION OF NONAUTOLOGOUS RED BLOOD CELLS INTO PERIPHERAL VEIN, PERCUTANEOUS APPROACH: ICD-10-PCS | Performed by: INTERNAL MEDICINE

## 2024-06-11 PROCEDURE — D9220A PRA ANESTHESIA: Mod: ,,, | Performed by: STUDENT IN AN ORGANIZED HEALTH CARE EDUCATION/TRAINING PROGRAM

## 2024-06-11 PROCEDURE — 27201012 HC FORCEPS, HOT/COLD, DISP: Performed by: INTERNAL MEDICINE

## 2024-06-11 PROCEDURE — 37000008 HC ANESTHESIA 1ST 15 MINUTES: Performed by: INTERNAL MEDICINE

## 2024-06-11 PROCEDURE — 80053 COMPREHEN METABOLIC PANEL: CPT | Performed by: STUDENT IN AN ORGANIZED HEALTH CARE EDUCATION/TRAINING PROGRAM

## 2024-06-11 PROCEDURE — 63600175 PHARM REV CODE 636 W HCPCS: Performed by: STUDENT IN AN ORGANIZED HEALTH CARE EDUCATION/TRAINING PROGRAM

## 2024-06-11 PROCEDURE — 43239 EGD BIOPSY SINGLE/MULTIPLE: CPT | Performed by: INTERNAL MEDICINE

## 2024-06-11 PROCEDURE — 85027 COMPLETE CBC AUTOMATED: CPT | Performed by: STUDENT IN AN ORGANIZED HEALTH CARE EDUCATION/TRAINING PROGRAM

## 2024-06-11 PROCEDURE — 86850 RBC ANTIBODY SCREEN: CPT | Performed by: STUDENT IN AN ORGANIZED HEALTH CARE EDUCATION/TRAINING PROGRAM

## 2024-06-11 PROCEDURE — 11000001 HC ACUTE MED/SURG PRIVATE ROOM

## 2024-06-11 PROCEDURE — 36415 COLL VENOUS BLD VENIPUNCTURE: CPT | Performed by: INTERNAL MEDICINE

## 2024-06-11 PROCEDURE — 37000009 HC ANESTHESIA EA ADD 15 MINS: Performed by: INTERNAL MEDICINE

## 2024-06-11 PROCEDURE — 83735 ASSAY OF MAGNESIUM: CPT | Performed by: STUDENT IN AN ORGANIZED HEALTH CARE EDUCATION/TRAINING PROGRAM

## 2024-06-11 PROCEDURE — 86920 COMPATIBILITY TEST SPIN: CPT | Performed by: STUDENT IN AN ORGANIZED HEALTH CARE EDUCATION/TRAINING PROGRAM

## 2024-06-11 PROCEDURE — 0DB68ZX EXCISION OF STOMACH, VIA NATURAL OR ARTIFICIAL OPENING ENDOSCOPIC, DIAGNOSTIC: ICD-10-PCS | Performed by: INTERNAL MEDICINE

## 2024-06-11 PROCEDURE — 25000003 PHARM REV CODE 250: Performed by: NURSE ANESTHETIST, CERTIFIED REGISTERED

## 2024-06-11 PROCEDURE — 63600175 PHARM REV CODE 636 W HCPCS: Performed by: NURSE ANESTHETIST, CERTIFIED REGISTERED

## 2024-06-11 PROCEDURE — C9113 INJ PANTOPRAZOLE SODIUM, VIA: HCPCS | Performed by: STUDENT IN AN ORGANIZED HEALTH CARE EDUCATION/TRAINING PROGRAM

## 2024-06-11 PROCEDURE — 43239 EGD BIOPSY SINGLE/MULTIPLE: CPT | Mod: ,,, | Performed by: INTERNAL MEDICINE

## 2024-06-11 PROCEDURE — 0DB58ZX EXCISION OF ESOPHAGUS, VIA NATURAL OR ARTIFICIAL OPENING ENDOSCOPIC, DIAGNOSTIC: ICD-10-PCS | Performed by: INTERNAL MEDICINE

## 2024-06-11 PROCEDURE — 88305 TISSUE EXAM BY PATHOLOGIST: CPT | Performed by: PATHOLOGY

## 2024-06-11 PROCEDURE — P9021 RED BLOOD CELLS UNIT: HCPCS | Performed by: STUDENT IN AN ORGANIZED HEALTH CARE EDUCATION/TRAINING PROGRAM

## 2024-06-11 PROCEDURE — 85610 PROTHROMBIN TIME: CPT | Performed by: STUDENT IN AN ORGANIZED HEALTH CARE EDUCATION/TRAINING PROGRAM

## 2024-06-11 PROCEDURE — 99223 1ST HOSP IP/OBS HIGH 75: CPT | Mod: 25,,, | Performed by: INTERNAL MEDICINE

## 2024-06-11 PROCEDURE — 36415 COLL VENOUS BLD VENIPUNCTURE: CPT | Performed by: STUDENT IN AN ORGANIZED HEALTH CARE EDUCATION/TRAINING PROGRAM

## 2024-06-11 PROCEDURE — 25000003 PHARM REV CODE 250: Performed by: STUDENT IN AN ORGANIZED HEALTH CARE EDUCATION/TRAINING PROGRAM

## 2024-06-11 PROCEDURE — 88305 TISSUE EXAM BY PATHOLOGIST: CPT | Mod: 26,,, | Performed by: PATHOLOGY

## 2024-06-11 PROCEDURE — 36430 TRANSFUSION BLD/BLD COMPNT: CPT

## 2024-06-11 RX ORDER — LIDOCAINE HYDROCHLORIDE 20 MG/ML
INJECTION INTRAVENOUS
Status: DISCONTINUED | OUTPATIENT
Start: 2024-06-11 | End: 2024-06-11

## 2024-06-11 RX ORDER — IBUPROFEN 200 MG
16 TABLET ORAL
Status: DISCONTINUED | OUTPATIENT
Start: 2024-06-11 | End: 2024-06-12 | Stop reason: HOSPADM

## 2024-06-11 RX ORDER — DEXMEDETOMIDINE HYDROCHLORIDE 100 UG/ML
INJECTION, SOLUTION INTRAVENOUS
Status: DISCONTINUED | OUTPATIENT
Start: 2024-06-11 | End: 2024-06-11

## 2024-06-11 RX ORDER — PROPOFOL 10 MG/ML
VIAL (ML) INTRAVENOUS CONTINUOUS PRN
Status: DISCONTINUED | OUTPATIENT
Start: 2024-06-11 | End: 2024-06-11

## 2024-06-11 RX ORDER — NALOXONE HCL 0.4 MG/ML
0.02 VIAL (ML) INJECTION
Status: DISCONTINUED | OUTPATIENT
Start: 2024-06-11 | End: 2024-06-12 | Stop reason: HOSPADM

## 2024-06-11 RX ORDER — SODIUM CHLORIDE, SODIUM LACTATE, POTASSIUM CHLORIDE, CALCIUM CHLORIDE 600; 310; 30; 20 MG/100ML; MG/100ML; MG/100ML; MG/100ML
INJECTION, SOLUTION INTRAVENOUS CONTINUOUS PRN
Status: DISCONTINUED | OUTPATIENT
Start: 2024-06-11 | End: 2024-06-11

## 2024-06-11 RX ORDER — HYDROCODONE BITARTRATE AND ACETAMINOPHEN 500; 5 MG/1; MG/1
TABLET ORAL
Status: DISCONTINUED | OUTPATIENT
Start: 2024-06-11 | End: 2024-06-12 | Stop reason: HOSPADM

## 2024-06-11 RX ORDER — PROPOFOL 10 MG/ML
VIAL (ML) INTRAVENOUS
Status: DISCONTINUED | OUTPATIENT
Start: 2024-06-11 | End: 2024-06-11

## 2024-06-11 RX ORDER — GLUCAGON 1 MG
1 KIT INJECTION
Status: DISCONTINUED | OUTPATIENT
Start: 2024-06-11 | End: 2024-06-12 | Stop reason: HOSPADM

## 2024-06-11 RX ORDER — MUPIROCIN 20 MG/G
OINTMENT TOPICAL 2 TIMES DAILY
Status: DISCONTINUED | OUTPATIENT
Start: 2024-06-11 | End: 2024-06-12 | Stop reason: HOSPADM

## 2024-06-11 RX ORDER — PANTOPRAZOLE SODIUM 40 MG/10ML
40 INJECTION, POWDER, LYOPHILIZED, FOR SOLUTION INTRAVENOUS 2 TIMES DAILY
Status: DISCONTINUED | OUTPATIENT
Start: 2024-06-11 | End: 2024-06-12

## 2024-06-11 RX ORDER — SODIUM CHLORIDE 0.9 % (FLUSH) 0.9 %
10 SYRINGE (ML) INJECTION EVERY 12 HOURS PRN
Status: DISCONTINUED | OUTPATIENT
Start: 2024-06-11 | End: 2024-06-12 | Stop reason: HOSPADM

## 2024-06-11 RX ORDER — IBUPROFEN 200 MG
24 TABLET ORAL
Status: DISCONTINUED | OUTPATIENT
Start: 2024-06-11 | End: 2024-06-12 | Stop reason: HOSPADM

## 2024-06-11 RX ADMIN — MUPIROCIN: 20 OINTMENT TOPICAL at 09:06

## 2024-06-11 RX ADMIN — PROPOFOL 250 MCG/KG/MIN: 10 INJECTION, EMULSION INTRAVENOUS at 03:06

## 2024-06-11 RX ADMIN — PANTOPRAZOLE SODIUM 40 MG: 40 INJECTION, POWDER, LYOPHILIZED, FOR SOLUTION INTRAVENOUS at 04:06

## 2024-06-11 RX ADMIN — Medication 50 MG: at 03:06

## 2024-06-11 RX ADMIN — GLYCOPYRROLATE 0.2 MG: 0.2 INJECTION, SOLUTION INTRAMUSCULAR; INTRAVITREAL at 03:06

## 2024-06-11 RX ADMIN — MUPIROCIN: 20 OINTMENT TOPICAL at 12:06

## 2024-06-11 RX ADMIN — TOPICAL ANESTHETIC 1 EACH: 200 SPRAY DENTAL; PERIODONTAL at 03:06

## 2024-06-11 RX ADMIN — DEXMEDETOMIDINE HYDROCHLORIDE 12 MCG: 100 INJECTION, SOLUTION, CONCENTRATE INTRAVENOUS at 03:06

## 2024-06-11 RX ADMIN — Medication 100 MG: at 03:06

## 2024-06-11 RX ADMIN — LIDOCAINE HYDROCHLORIDE 75 MG: 20 INJECTION, SOLUTION INTRAVENOUS at 03:06

## 2024-06-11 RX ADMIN — PANTOPRAZOLE SODIUM 40 MG: 40 INJECTION, POWDER, LYOPHILIZED, FOR SOLUTION INTRAVENOUS at 09:06

## 2024-06-11 RX ADMIN — SODIUM CHLORIDE, SODIUM LACTATE, POTASSIUM CHLORIDE, AND CALCIUM CHLORIDE: .6; .31; .03; .02 INJECTION, SOLUTION INTRAVENOUS at 03:06

## 2024-06-11 NOTE — PLAN OF CARE
met with patient with K[facility] Lemons Claudia ID#[id number]. No family at bedside. Patient reports he is independent and lives at home with friends. He does not use any DME or have Home Health. He still drives, but family/friends will transport home at discharge. Patient does not have any insurance or a PCP. Patient is agreeable for  to schedule. Patient encouraged to call with any questions or concerns.  will continue to follow patient through transitions of care and assist with any discharge needs.     New PCP follow-up scheduled.    Patient Contacts    Name Relation Home Work Mobile   Hugo Dumont   [phone]       [facility]   [phone] [address] [place] [address]      Next Steps: Follow up on 6/24/2024  Instructions: at 10:00 am--Médico de Atención Primaria. Traiga identificación, medicamentos, documentos de fatuma y comprobante de ingresos. Trabajan en abigail escala móvil de pago. Fax#[phone] OR [phone].        06/11/24 1341   Discharge Assessment   Assessment Type Discharge Planning Assessment   Confirmed/corrected address, phone number and insurance Yes   Confirmed Demographics Correct on Facesheet   Source of Information patient   People in Home friend(s)   Facility Arrived From: [facility]   Do you expect to return to your current living situation? Yes   Prior to hospitilization cognitive status: Alert/Oriented   Current cognitive status: Alert/Oriented   Walking or Climbing Stairs Difficulty no   Dressing/Bathing Difficulty no   Equipment Currently Used at Home none   Do you take prescription medications? No   Do you have prescription coverage? No   Do you have any problems affording any of your prescribed medications? TBD   How do you get to doctors appointments? family or friend will provide   Are you on dialysis? No   Do you take coumadin? No   Discharge Plan A Home    Discharge Plan B Home with family   DME Needed Upon Discharge  none   Discharge Plan discussed with: Patient   Transition of Care Barriers Underinsured   Physical Activity   On average, how many days per week do you engage in moderate to strenuous exercise (like a brisk walk)? Pt Declined   On average, how many minutes do you engage in exercise at this level? Pt Declined   Financial Resource Strain   How hard is it for you to pay for the very basics like food, housing, medical care, and heating? Pt Declined   Housing Stability   In the last 12 months, was there a time when you were not able to pay the mortgage or rent on time? N   At any time in the past 12 months, were you homeless or living in a shelter (including now)? N   Transportation Needs   Has the lack of transportation kept you from medical appointments, meetings, work or from getting things needed for daily living? No   Food Insecurity   Within the past 12 months, you worried that your food would run out before you got the money to buy more. Never true   Within the past 12 months, the food you bought just didn't last and you didn't have money to get more. Never true   Stress   Do you feel stress - tense, restless, nervous, or anxious, or unable to sleep at night because your mind is troubled all the time - these days? Pt Declined   Social Isolation   How often do you feel lonely or isolated from those around you?  Never   Alcohol Use   Q1: How often do you have a drink containing alcohol? Pt Declined   Q2: How many drinks containing alcohol do you have on a typical day when you are drinking? Pt Declined   Q3: How often do you have six or more drinks on one occasion? Pt Declined   Utilities   In the past 12 months has the electric, gas, oil, or water company threatened to shut off services in your home? Pt Declined   Health Literacy   How often do you need to have someone help you when you read instructions, pamphlets, or other written material from your  doctor or pharmacy? Never     Shante Vicente RN    [phone]

## 2024-06-11 NOTE — ASSESSMENT & PLAN NOTE
Suspected peptic ulcer disease.  Blood transfusion keep hemoglobin above 7.  Start pantoprazole IV 40 mg b.i.d..  GI consult for endoscopy.  NPO.

## 2024-06-11 NOTE — SUBJECTIVE & OBJECTIVE
No past medical history on file.    Past Surgical History:   Procedure Laterality Date    APPENDECTOMY      TESTICLE SURGERY      7 years old       Review of patient's allergies indicates:  No Known Allergies    Current Facility-Administered Medications on File Prior to Encounter   Medication    [COMPLETED] acetaminophen tablet 1,000 mg    [COMPLETED] cefTRIAXone (Rocephin) 1 g in dextrose 5 % in water (D5W) 100 mL IVPB (MB+)    [COMPLETED] octreotide injection 100 mcg    [COMPLETED] ondansetron injection 4 mg    [COMPLETED] pantoprazole (PROTONIX) 40 mg injection    [COMPLETED] pantoprazole 40 mg in D5W 100 mL infusion (ready to mix)    [COMPLETED] pantoprazole injection 80 mg    [COMPLETED] sodium chloride 0.9% bolus 1,000 mL 1,000 mL    [DISCONTINUED] 0.9%  NaCl infusion (for blood administration)    [DISCONTINUED] octreotide (SANDOSTATIN) 500 mcg in sodium chloride 0.9% 100 mL infusion     Current Outpatient Medications on File Prior to Encounter   Medication Sig    mupirocin (BACTROBAN) 2 % ointment Apply to affected area 3 times daily     Family History       Problem Relation (Age of Onset)    No Known Problems Mother, Father          Tobacco Use    Smoking status: Some Days     Current packs/day: 0.50     Average packs/day: 0.5 packs/day for 0.5 years (0.3 ttl pk-yrs)     Types: Cigarettes    Smokeless tobacco: Never   Substance and Sexual Activity    Alcohol use: Yes     Alcohol/week: 12.0 standard drinks of alcohol     Types: 12 Cans of beer per week    Drug use: No    Sexual activity: Yes     Review of Systems   Constitutional:  Positive for fatigue.   Gastrointestinal:  Positive for abdominal pain, nausea and vomiting.   All other systems reviewed and are negative.    Objective:     Vital Signs (Most Recent):  Temp: 98.5 °F (36.9 °C) (06/11/24 0153)  Pulse: 91 (06/11/24 0153)  Resp: 18 (06/11/24 0153)  BP: (!) 106/56 (06/11/24 0153)  SpO2: 98 % (06/11/24 0153) Vital Signs (24h Range):  Temp:  [97.8 °F  (36.6 °C)-100 °F (37.8 °C)] 98.5 °F (36.9 °C)  Pulse:  [] 91  Resp:  [16-20] 18  SpO2:  [98 %-100 %] 98 %  BP: (106-142)/(53-74) 106/56        There is no height or weight on file to calculate BMI.     Physical Exam  Constitutional:       Appearance: Normal appearance.   HENT:      Right Ear: External ear normal.      Left Ear: External ear normal.      Mouth/Throat:      Mouth: Mucous membranes are moist.      Pharynx: Oropharynx is clear.   Eyes:      Extraocular Movements: Extraocular movements intact.      Conjunctiva/sclera: Conjunctivae normal.   Cardiovascular:      Rate and Rhythm: Normal rate and regular rhythm.   Pulmonary:      Effort: Pulmonary effort is normal.      Breath sounds: Normal breath sounds.   Abdominal:      General: Abdomen is flat. Bowel sounds are normal.      Palpations: Abdomen is soft.   Skin:     General: Skin is warm.      Capillary Refill: Capillary refill takes less than 2 seconds.   Neurological:      General: No focal deficit present.      Mental Status: He is alert and oriented to person, place, and time.                Significant Labs: All pertinent labs within the past 24 hours have been reviewed.    Significant Imaging: I have reviewed all pertinent imaging results/findings within the past 24 hours.

## 2024-06-11 NOTE — TRANSFER OF CARE
"Anesthesia Transfer of Care Note    Patient: Rex Dumont    Procedure(s) Performed: Procedure(s) (LRB):  EGD (ESOPHAGOGASTRODUODENOSCOPY) (N/A)    Patient location: GI    Anesthesia Type: general    Transport from OR: Transported from OR on room air with adequate spontaneous ventilation    Post pain: adequate analgesia    Post assessment: no apparent anesthetic complications and tolerated procedure well    Post vital signs: stable    Level of consciousness: awake    Nausea/Vomiting: no nausea/vomiting    Complications: none    Transfer of care protocol was followed      Last vitals: Visit Vitals  BP (!) 117/56   Pulse 71   Temp 37.1 °C (98.8 °F)   Resp 20   Ht 5' 4.96" (1.65 m)   Wt 86 kg (189 lb 9.5 oz)   SpO2 99%   BMI 31.59 kg/m²     "

## 2024-06-11 NOTE — PLAN OF CARE
Recovery complete. Patient recovered to baseline. Discharge instructions reviewed with patient. VSS.  Report called to nurse

## 2024-06-11 NOTE — CONSULTS
"[facility]    CC: emesis, dark stools, abdominal pain    HPI 28 y.o. male with history of tobacco use who presented with acute onset of abdominal pain and associated dark emesis and dark stools. This has never happened before.     Emesis episode 3 days ago that has not recurred. Having several dark stools over the past week. Also with weakness over the same time course. No NSAID use, no alcohol use. Smokes 0.5 ppd.     Past Medical History  No past medical history on file.    Physical Examination  BP (!) 117/56   Pulse 71   Temp 98.8 °F (37.1 °C)   Resp 20   Ht 5' 4.96" (1.65 m)   Wt 86 kg (189 lb 9.5 oz)   SpO2 99%   BMI 31.59 kg/m²   General appearance: alert, cooperative, no distress  HENT: Normocephalic, atraumatic. Anicteric sclera. No sublingual jaundice.  Lungs:  Normal work of breathing  Abdomen: Soft, non-tender; bowel sounds normoactive; no organomegaly  Skin: No jaundice    Labs:   Lab Results   Component Value Date    WBC 14.73 (H) 06/11/2024    HGB 7.4 (L) 06/11/2024    HCT 19.4 (LL) 06/11/2024    MCV 91 06/11/2024     06/11/2024         Assessment and Recommendations: 28 y.o. male with hx of tobacco use. GI consulted for    Acute GI blood loss anemia  Associated with coffee ground emesis (x1 episode) and dark stools for the past week. No NSAID/alcohol use.     -EGD today  -NPO  -Transfuse for hgb goal >7  -Maintain active type and screen  -2 large bore IV's  -Protonix 40mg BID    This represents 1 undiagnosed new problem with uncertain prognosis requiring major procedure with risk factors      Cristian Luther MD  Gastroenterology Fellow - [facility]    "

## 2024-06-11 NOTE — HPI
28 years old man with past medical history of tobacco use disorder presented with nausea, vomiting, epigastric abdominal pain and melena started 3 days ago along with generalized fatigue and weakness.  Patient stated that he was in his usual health prior to that and he has never experienced anything similar.  He denies any history of alcohol use, recreational drug use, over-the-counter NSAID.  His hemoglobin was found to be 4.5 and the outside facility and he received 2 packed RBC.  He stated that is epigastric pain is not related to food.

## 2024-06-11 NOTE — PLAN OF CARE
GI Update    EGD today showed nonbleeding ulcer in the duodenal bulb without high risk features. Suspect this is source of hematin and dark stools. Also some erythema in duodenum. Gastric biopsies taken for h pylori.    -Protonix 40mg daily for 6 weeks, starting now  -Recheck CBC this evening  -Maintain active type and screen  -OK to eat this evening  -Pathology pending    Cristian Luther MD  Gastroenterology Fellow - [facility]

## 2024-06-11 NOTE — PROGRESS NOTES
06/11/24 0350   Admission   Initial VN Admission Questions Complete   Shift   Virtual Nurse - Patient Verbalized Approval Of Camera Use;VN Rounding   Safety/Activity   Patient Rounds bed in low position;call light in patient/parent reach;clutter free environment maintained;visualized patient;placement of personal items at bedside   Safety Promotion/Fall Prevention assistive device/personal item within reach;bed alarm set;Fall Risk reviewed with patient/family;side rails raised x 2   Positioning   Body Position supine   Head of Bed (HOB) Positioning HOB at 30-45 degrees     VN cued in to pt's room with permission. Pt's brother at bedside. Pt [place] speaking.  Teja ( Id #[id number]) via vidyo monitor utilized for all translation needs. Admission questions completed with pt and plan of care reviewed. Pt denies any questions or concerns at this time. Call bell w/in reach. Instructed to call for needs/assist oob. Phlebotomy at bedside.

## 2024-06-11 NOTE — PLAN OF CARE
Problem: Adult Inpatient Plan of Care  Goal: Plan of Care Review  Outcome: Progressing     Problem: Gastrointestinal Bleeding  Goal: Optimal Coping with Acute Illness  Outcome: Progressing     Problem: Anemia  Goal: Anemia Symptom Improvement  Outcome: Progressing

## 2024-06-11 NOTE — H&P
[facility] Medicine  History & Physical    Patient Name: Rex Dumont  MRN: [id number]  Patient Class: IP- Inpatient  Admission Date: 6/11/2024  Attending Physician: Davian Perkins MD  Primary Care Provider: Maria Guadalupe Primary Doctor         Patient information was obtained from patient and ER records.     Subjective:     Principal Problem:Upper GI bleed    Chief Complaint: No chief complaint on file.       HPI: 28 years old man with past medical history of tobacco use disorder presented with nausea, vomiting, epigastric abdominal pain and melena started 3 days ago along with generalized fatigue and weakness.  Patient stated that he was in his usual health prior to that and he has never experienced anything similar.  He denies any history of alcohol use, recreational drug use, over-the-counter NSAID.  His hemoglobin was found to be 4.5 and the outside facility and he received 2 packed RBC.  He stated that is epigastric pain is not related to food.    No past medical history on file.    Past Surgical History:   Procedure Laterality Date    APPENDECTOMY      TESTICLE SURGERY      7 years old       Review of patient's allergies indicates:  No Known Allergies    Current Facility-Administered Medications on File Prior to Encounter   Medication    [COMPLETED] acetaminophen tablet 1,000 mg    [COMPLETED] cefTRIAXone (Rocephin) 1 g in dextrose 5 % in water (D5W) 100 mL IVPB (MB+)    [COMPLETED] octreotide injection 100 mcg    [COMPLETED] ondansetron injection 4 mg    [COMPLETED] pantoprazole (PROTONIX) 40 mg injection    [COMPLETED] pantoprazole 40 mg in D5W 100 mL infusion (ready to mix)    [COMPLETED] pantoprazole injection 80 mg    [COMPLETED] sodium chloride 0.9% bolus 1,000 mL 1,000 mL    [DISCONTINUED] 0.9%  NaCl infusion (for blood administration)    [DISCONTINUED] octreotide (SANDOSTATIN) 500 mcg in sodium chloride 0.9% 100 mL infusion     Current Outpatient Medications on File Prior to  Encounter   Medication Sig    mupirocin (BACTROBAN) 2 % ointment Apply to affected area 3 times daily     Family History       Problem Relation (Age of Onset)    No Known Problems Mother, Father          Tobacco Use    Smoking status: Some Days     Current packs/day: 0.50     Average packs/day: 0.5 packs/day for 0.5 years (0.3 ttl pk-yrs)     Types: Cigarettes    Smokeless tobacco: Never   Substance and Sexual Activity    Alcohol use: Yes     Alcohol/week: 12.0 standard drinks of alcohol     Types: 12 Cans of beer per week    Drug use: No    Sexual activity: Yes     Review of Systems   Constitutional:  Positive for fatigue.   Gastrointestinal:  Positive for abdominal pain, nausea and vomiting.   All other systems reviewed and are negative.    Objective:     Vital Signs (Most Recent):  Temp: 98.5 °F (36.9 °C) (06/11/24 0153)  Pulse: 91 (06/11/24 0153)  Resp: 18 (06/11/24 0153)  BP: (!) 106/56 (06/11/24 0153)  SpO2: 98 % (06/11/24 0153) Vital Signs (24h Range):  Temp:  [97.8 °F (36.6 °C)-100 °F (37.8 °C)] 98.5 °F (36.9 °C)  Pulse:  [] 91  Resp:  [16-20] 18  SpO2:  [98 %-100 %] 98 %  BP: (106-142)/(53-74) 106/56        There is no height or weight on file to calculate BMI.     Physical Exam  Constitutional:       Appearance: Normal appearance.   HENT:      Right Ear: External ear normal.      Left Ear: External ear normal.      Mouth/Throat:      Mouth: Mucous membranes are moist.      Pharynx: Oropharynx is clear.   Eyes:      Extraocular Movements: Extraocular movements intact.      Conjunctiva/sclera: Conjunctivae normal.   Cardiovascular:      Rate and Rhythm: Normal rate and regular rhythm.   Pulmonary:      Effort: Pulmonary effort is normal.      Breath sounds: Normal breath sounds.   Abdominal:      General: Abdomen is flat. Bowel sounds are normal.      Palpations: Abdomen is soft.   Skin:     General: Skin is warm.      Capillary Refill: Capillary refill takes less than 2 seconds.   Neurological:       General: No focal deficit present.      Mental Status: He is alert and oriented to person, place, and time.                Significant Labs: All pertinent labs within the past 24 hours have been reviewed.    Significant Imaging: I have reviewed all pertinent imaging results/findings within the past 24 hours.  Assessment/Plan:     * Upper GI bleed  Suspected peptic ulcer disease.  Blood transfusion keep hemoglobin above 7.  Start pantoprazole IV 40 mg b.i.d..  GI consult for endoscopy.  NPO.    Anemia due to acute blood loss  Blood transfusion as above      VTE Risk Mitigation (From admission, onward)           Ordered     IP VTE HIGH RISK PATIENT  Once         06/11/24 0215     Place sequential compression device  Until discontinued         06/11/24 0215     Reason for No Pharmacological VTE Prophylaxis  Once        Question:  Reasons:  Answer:  Active Bleeding    06/11/24 0215                                    Davian Perkins MD  Department of Hospital Medicine  [facility]

## 2024-06-11 NOTE — NURSING
H &H 6.5 & 19.4, Blood transfusion started.  Patient AAO X4 without complaints.  Patient remains NPO.Safety maintained, call light in reach.

## 2024-06-11 NOTE — PLAN OF CARE
Patient is AAOx4. Tolerated regular diet. Completed EGD today. Ambulated upto bathroom with standby assistance. No any BM today. Instructed to use call light for assistance. Bed alarm set and maintained at lowest position. Call light within reach. Urinal within reach.

## 2024-06-11 NOTE — PROVATION PATIENT INSTRUCTIONS
Discharge Summary/Instructions after an Endoscopic Procedure  Patient Name: Rex Dumont  Patient MRN: [id number]  Patient   YOB: 1995 Tuesday, June 11, 2024  Rafael Min MD  Dear patient,  As a result of recent federal legislation (The Federal Cures Act), you may   receive lab or pathology results from your procedure in your [facility]   account before your physician is able to contact you. Your physician or   their representative will relay the results to you with their   recommendations at their soonest availability.  Thank you,  Your health is very important to us during the Covid Crisis. Following your   procedure today, you will receive a daily text for 2 weeks asking about   signs or symptoms of Covid 19.  Please respond to this text when you   receive it so we can follow up and keep you as safe as possible.   RESTRICTIONS:  During your procedure today, you received medications for sedation.  These   medications may affect your judgment, balance and coordination.  Therefore,   for 24 hours, you have the following restrictions:   - DO NOT drive a car, operate machinery, make legal/financial decisions,   sign important papers or drink alcohol.    ACTIVITY:  Today: no heavy lifting, straining or running due to procedural   sedation/anesthesia.  The following day: return to full activity including work.  DIET:  Eat and drink normally unless instructed otherwise.     TREATMENT FOR COMMON SIDE EFFECTS:  - Mild abdominal pain, nausea, belching, bloating or excessive gas:  rest,   eat lightly and use a heating pad.  - Sore Throat: treat with throat lozenges and/or gargle with warm salt   water.  - Because air was used during the procedure, expelling large amounts of air   from your rectum or belching is normal.  - If a bowel prep was taken, you may not have a bowel movement for 1-3 days.    This is normal.  SYMPTOMS TO WATCH FOR AND REPORT TO YOUR PHYSICIAN:  1. Abdominal pain or  bloating, other than gas cramps.  2. Chest pain.  3. Back pain.  4. Signs of infection such as: chills or fever occurring within 24 hours   after the procedure.  5. Rectal bleeding, which would show as bright red, maroon, or black stools.   (A tablespoon of blood from the rectum is not serious, especially if   hemorrhoids are present.)  6. Vomiting.  7. Weakness or dizziness.  GO DIRECTLY TO THE NEAREST EMERGENCY ROOM IF YOU HAVE ANY OF THE FOLLOWING:      Difficulty breathing              Chills and/or fever over 101 F   Persistent vomiting and/or vomiting blood   Severe abdominal pain   Severe chest pain   Black, tarry stools   Bleeding- more than one tablespoon   Any other symptom or condition that you feel may need urgent attention  Your doctor recommends these additional instructions:  If any biopsies were taken, your doctors clinic will contact you in 1 to 2   weeks with any results.  - Discharge patient to home.   - Resume previous diet.   - Continue present medications.   - Await pathology results.   - Use Protonix (pantoprazole) 40 mg PO daily for 6 weeks.  For questions, problems or results please call your physician - Rafael Min MD.  EMERGENCY PHONE NUMBER: [phone],  LAB RESULTS: [phone]  IF A COMPLICATION OR EMERGENCY SITUATION ARISES AND YOU ARE UNABLE TO REACH   YOUR PHYSICIAN - GO DIRECTLY TO THE EMERGENCY ROOM.  Rafael Min MD  6/11/2024 1:54:20 PM  This report has been verified and signed electronically.  Dear patient,  As a result of recent federal legislation (The Federal Cures Act), you may   receive lab or pathology results from your procedure in your [facility]   account before your physician is able to contact you. Your physician or   their representative will relay the results to you with their   recommendations at their soonest availability.  Thank you,  PR[employer]ATION

## 2024-06-11 NOTE — ANESTHESIA PREPROCEDURE EVALUATION
06/11/2024  [facility]  Anesthesia Pre-Operative Evaluation         Patient Name: Rex Dumont  YOB: 1995  MRN: [id number]    SUBJECTIVE:     Pre-operative evaluation for Procedure(s) (LRB):  EGD (ESOPHAGOGASTRODUODENOSCOPY) (N/A)     06/11/2024    Rex Dumont is a 28 y.o. male w/ a significant PMHx of smoking admitted with dark emesis and stools found to severe anemia.   Patient now presents for the above procedure(s).    Last hemoglobin 7.4 at 10am    TTE:   none documented.     Patient Active Problem List   Diagnosis    Upper GI bleed    Anemia due to acute blood loss       Review of patient's allergies indicates:  No Known Allergies    Current Inpatient Medications:   mupirocin   Nasal BID    pantoprazole  40 mg Intravenous BID       No current facility-administered medications on file prior to encounter.     No current outpatient medications on file prior to encounter.       Past Surgical History:   Procedure Laterality Date    APPENDECTOMY      TESTICLE SURGERY      7 years old       OBJECTIVE:     Vital Signs Range (Last 24H):  Temp:  [36.6 °C (97.8 °F)-37.8 °C (100 °F)]   Pulse:  []   Resp:  [16-20]   BP: (105-142)/(52-74)   SpO2:  [96 %-100 %]       Significant Labs:  Lab Results   Component Value Date    WBC 14.73 (H) 06/11/2024    HGB 7.4 (L) 06/11/2024    HCT 19.4 (LL) 06/11/2024     06/11/2024    ALT 9 (L) 06/11/2024    AST 12 06/11/2024     06/11/2024    K 4.2 06/11/2024     06/11/2024    CREATININE 0.9 06/11/2024    BUN 8 06/11/2024    CO2 22 (L) 06/11/2024    INR 1.0 06/11/2024       Diagnostic Studies: No relevant studies.    EKG:   No results found for this or any previous visit.    ASSESSMENT/PLAN:           Pre-op Assessment    I have reviewed the Patient Summary Reports.     I have reviewed the Nursing Notes. I  have reviewed the NPO Status.   I have reviewed the Medications.     Review of Systems  Anesthesia Hx:  No problems with previous Anesthesia             Denies Family Hx of Anesthesia complications.    Denies Personal Hx of Anesthesia complications.                    Social:  Smoker       Hematology/Oncology:       -- Anemia:               Hematology Comments: Acute GIB                    EENT/Dental:  EENT/Dental Normal           Cardiovascular:      Denies Hypertension.    Denies CAD.    Denies CABG/stent.  Denies Dysrhythmias.   Denies Angina.                                  Pulmonary:    Denies COPD.      Denies Sleep Apnea.                Renal/:  Renal/ Normal                 Hepatic/GI:        Acute blood loss anemia likely 2/2 to GIB          Neurological:  Denies TIA.  Denies CVA.                                    Endocrine:  Denies Diabetes.               Physical Exam  General: Cooperative, Alert and Oriented    Airway:  Mallampati: II   Mouth Opening: Normal  TM Distance: Normal  Tongue: Normal  Neck ROM: Normal ROM    Dental:  Intact        Anesthesia Plan  Type of Anesthesia, risks & benefits discussed:    Anesthesia Type: Gen Natural Airway  Intra-op Monitoring Plan: Standard ASA Monitors  Post Op Pain Control Plan: multimodal analgesia  Induction:  IV  Informed Consent: Informed consent signed with the Patient and all parties understand the risks and agree with anesthesia plan.  All questions answered.   ASA Score: 3  Day of Surgery Review of History & Physical: H&P Update referred to the surgeon/provider.    Ready For Surgery From Anesthesia Perspective.     .

## 2024-06-12 VITALS
RESPIRATION RATE: 20 BRPM | HEIGHT: 65 IN | OXYGEN SATURATION: 99 % | BODY MASS INDEX: 28.65 KG/M2 | WEIGHT: 171.94 LBS | SYSTOLIC BLOOD PRESSURE: 101 MMHG | TEMPERATURE: 98 F | DIASTOLIC BLOOD PRESSURE: 55 MMHG | HEART RATE: 70 BPM

## 2024-06-12 LAB
ALBUMIN SERPL BCP-MCNC: 2.7 G/DL (ref 3.5–5.2)
ALP SERPL-CCNC: 42 U/L (ref 55–135)
ALT SERPL W/O P-5'-P-CCNC: 10 U/L (ref 10–44)
ANION GAP SERPL CALC-SCNC: 6 MMOL/L (ref 8–16)
AST SERPL-CCNC: 14 U/L (ref 10–40)
BASOPHILS # BLD AUTO: 0.03 K/UL (ref 0–0.2)
BASOPHILS NFR BLD: 0.3 % (ref 0–1.9)
BILIRUB SERPL-MCNC: 0.6 MG/DL (ref 0.1–1)
BUN SERPL-MCNC: 11 MG/DL (ref 6–20)
CALCIUM SERPL-MCNC: 8.3 MG/DL (ref 8.7–10.5)
CHLORIDE SERPL-SCNC: 109 MMOL/L (ref 95–110)
CO2 SERPL-SCNC: 24 MMOL/L (ref 23–29)
CREAT SERPL-MCNC: 0.8 MG/DL (ref 0.5–1.4)
DIFFERENTIAL METHOD BLD: ABNORMAL
EOSINOPHIL # BLD AUTO: 0.1 K/UL (ref 0–0.5)
EOSINOPHIL NFR BLD: 0.9 % (ref 0–8)
ERYTHROCYTE [DISTWIDTH] IN BLOOD BY AUTOMATED COUNT: 18.6 % (ref 11.5–14.5)
EST. GFR  (NO RACE VARIABLE): >60 ML/MIN/1.73 M^2
GLUCOSE SERPL-MCNC: 100 MG/DL (ref 70–110)
HCT VFR BLD AUTO: 21.4 % (ref 40–54)
HGB BLD-MCNC: 7.3 G/DL (ref 14–18)
IMM GRANULOCYTES # BLD AUTO: 0.14 K/UL (ref 0–0.04)
IMM GRANULOCYTES NFR BLD AUTO: 1.4 % (ref 0–0.5)
LYMPHOCYTES # BLD AUTO: 2.9 K/UL (ref 1–4.8)
LYMPHOCYTES NFR BLD: 30 % (ref 18–48)
MCH RBC QN AUTO: 31.3 PG (ref 27–31)
MCHC RBC AUTO-ENTMCNC: 34.1 G/DL (ref 32–36)
MCV RBC AUTO: 92 FL (ref 82–98)
MONOCYTES # BLD AUTO: 0.7 K/UL (ref 0.3–1)
MONOCYTES NFR BLD: 6.6 % (ref 4–15)
NEUTROPHILS # BLD AUTO: 6 K/UL (ref 1.8–7.7)
NEUTROPHILS NFR BLD: 60.8 % (ref 38–73)
NRBC BLD-RTO: 3 /100 WBC
PLATELET # BLD AUTO: 235 K/UL (ref 150–450)
PMV BLD AUTO: 10.1 FL (ref 9.2–12.9)
POTASSIUM SERPL-SCNC: 3.7 MMOL/L (ref 3.5–5.1)
PROT SERPL-MCNC: 4.9 G/DL (ref 6–8.4)
RBC # BLD AUTO: 2.33 M/UL (ref 4.6–6.2)
SODIUM SERPL-SCNC: 139 MMOL/L (ref 136–145)
WBC # BLD AUTO: 9.8 K/UL (ref 3.9–12.7)

## 2024-06-12 PROCEDURE — 36415 COLL VENOUS BLD VENIPUNCTURE: CPT | Performed by: INTERNAL MEDICINE

## 2024-06-12 PROCEDURE — 85025 COMPLETE CBC W/AUTO DIFF WBC: CPT | Performed by: INTERNAL MEDICINE

## 2024-06-12 PROCEDURE — 63600175 PHARM REV CODE 636 W HCPCS: Performed by: STUDENT IN AN ORGANIZED HEALTH CARE EDUCATION/TRAINING PROGRAM

## 2024-06-12 PROCEDURE — 80053 COMPREHEN METABOLIC PANEL: CPT | Performed by: INTERNAL MEDICINE

## 2024-06-12 PROCEDURE — C9113 INJ PANTOPRAZOLE SODIUM, VIA: HCPCS | Performed by: STUDENT IN AN ORGANIZED HEALTH CARE EDUCATION/TRAINING PROGRAM

## 2024-06-12 RX ORDER — FERROUS SULFATE 325(65) MG
325 TABLET ORAL DAILY
Qty: 30 TABLET | Refills: 2 | Status: SHIPPED | OUTPATIENT
Start: 2024-06-12 | End: 2024-09-10

## 2024-06-12 RX ORDER — PANTOPRAZOLE SODIUM 40 MG/1
40 TABLET, DELAYED RELEASE ORAL DAILY
Status: DISCONTINUED | OUTPATIENT
Start: 2024-06-12 | End: 2024-06-12 | Stop reason: HOSPADM

## 2024-06-12 RX ORDER — PANTOPRAZOLE SODIUM 40 MG/1
40 TABLET, DELAYED RELEASE ORAL DAILY
Qty: 60 TABLET | Refills: 0 | Status: SHIPPED | OUTPATIENT
Start: 2024-06-12 | End: 2024-08-11

## 2024-06-12 RX ADMIN — PANTOPRAZOLE SODIUM 40 MG: 40 INJECTION, POWDER, LYOPHILIZED, FOR SOLUTION INTRAVENOUS at 09:06

## 2024-06-12 RX ADMIN — MUPIROCIN: 20 OINTMENT TOPICAL at 09:06

## 2024-06-12 NOTE — PLAN OF CARE
Discharge orders noted. Additional clinical references attached.    Patient's discharge instructions given by bedside RN and reviewed via this VN with patient.    Education provided on new medication, diagnosis, and follow-up appointments.    All questions answered. Teach back method used. Patient verbalized understanding.    Transport to [facility] requested. Floor nurse notified.      06/12/24 1112   AVS Confirmation   Discharge instructions and AVS given to and reviewed with patient and/or significant other. Yes

## 2024-06-12 NOTE — ANESTHESIA POSTPROCEDURE EVALUATION
Anesthesia Post Evaluation    Patient: Rex Dumont    Procedure(s) Performed: Procedure(s) (LRB):  EGD (ESOPHAGOGASTRODUODENOSCOPY) (N/A)    Final Anesthesia Type: general      Patient location during evaluation: GI PACU  Patient participation: Yes- Able to Participate  Level of consciousness: awake and alert, oriented and awake  Post-procedure vital signs: reviewed and stable  Pain management: adequate  Airway patency: patent  LEWIS mitigation strategies: Multimodal analgesia  PONV status at discharge: No PONV  Anesthetic complications: no      Cardiovascular status: blood pressure returned to baseline and hemodynamically stable  Respiratory status: unassisted and spontaneous ventilation  Hydration status: euvolemic  Follow-up not needed.              Vitals Value Taken Time   /55 06/12/24 0352   Temp 36.7 °C (98.1 °F) 06/12/24 0352   Pulse 91 06/11/24 2326   Resp 17 06/12/24 0352   SpO2 100 % 06/12/24 0352         Event Time   Out of Recovery 16:39:33         Pain/Dipak Score: Pain Rating Post Med Admin: 0 (6/12/2024  5:13 AM)  Dipak Score: 10 (6/11/2024  4:23 PM)

## 2024-06-12 NOTE — PLAN OF CARE
Discharge orders noted. No DME or Home Health ordered. PCP follow-up scheduled.  will send GI referral to [facility] for follow-up. Family will transport home at discharge.      met with patient with [facility] ID#[id number]. Follow-Up information reviewed with patient and all questions answered. He will have his friend transport home at discharge.    Patient Contacts    Name Relation Home Work Mobile   Hugo Dumont   [phone]        Name Relationship Specialty Phone Fax Address Order                [facility]   [phone] [address] [place] [address]     Next Steps: Follow up on 6/24/2024  Instructions: at 10:00 am--Médico de Atención Primaria. Traiga identificación, medicamentos, documentos de fatuma y comprobante de ingresos. Trabajan en abigail escala móvil de pago. Fax#[phone] OR [phone].        [facility] - Gastroenterology  Gastroenterology [phone] [address] [facility] [place] [address]     Next Steps: Follow up in 2 week(s)  Instructions: El administrador de casos enviará abigail derivación a [facility] para un seguimiento de gastroenterología si es necesario.            06/12/24 0958   Final Note   Assessment Type Final Discharge Note   Anticipated Discharge Disposition Home   Hospital Resources/Appts/Education Provided Appointments scheduled and added to AVS   Post-Acute Status   Discharge Delays None known at this time     Shante Vicente RN    [phone]

## 2024-06-12 NOTE — NURSING
Scheduled medications given. Patient without any complaints overnight. Patient ambulating to the bathroom with stand by assist. Safety maintained, call light in reach, bed alarm in use.

## 2024-06-12 NOTE — PLAN OF CARE
Problem: Adult Inpatient Plan of Care  Goal: Plan of Care Review  Outcome: Progressing     Problem: Adult Inpatient Plan of Care  Goal: Optimal Comfort and Wellbeing  Outcome: Progressing     Problem: Adult Inpatient Plan of Care  Goal: Readiness for Transition of Care  Outcome: Progressing     Problem: Gastrointestinal Bleeding  Goal: Optimal Coping with Acute Illness  Outcome: Progressing     Problem: Anemia  Goal: Anemia Symptom Improvement  Outcome: Progressing     Problem: Pain Acute  Goal: Optimal Pain Control and Function  Outcome: Progressing     Problem: Fall Injury Risk  Goal: Absence of Fall and Fall-Related Injury  Outcome: Progressing

## 2024-06-14 LAB
FINAL PATHOLOGIC DIAGNOSIS: NORMAL
GROSS: NORMAL
Lab: NORMAL

## 2024-06-14 NOTE — PLAN OF CARE
"   Rex Dumont "Hugh" MRN:[id number] ([facility]#[id number]) (28 y.o. M) (Adm: 24)  [facility] [id number] A  Patient Demographics    Patient Name  Rex Dumont Legal Sex  Male          Age  1995 (28 y.o.) N   Address  [address] [place] [address]   [place] [address] Phone  [phone] (Home)  [phone] (Mobile) *Preferred*     Patient Demographics    Address  [address] [place] [address] Phone  [phone] (Home)  [phone] (Mobile) *Preferred* E-mail Address  [email or web address]     PCP and Center    Primary Care Provider  Primary Doctor No Center  None     Patient Contacts    Name Relation Home Work Mobile   Hugo Dumont   [phone]     Documents on File     Status Date Received Description   Documents for the Patient   Patient ID Received 08/10/17 MX ID    Insurance Documents      Notice of Privacy Pract Ackn Received 08/10/17    Clinic Authorization Received () 08/10/17    Consents      Clinic Authorization Signed () 19 self   Notice of Privacy Practice SCP Signed 06/10/24    Involvement In Care      Advance Directive Acknowledgement      Provider Based Acknowledgement      Patient Rights and Responsibilities Acknowledged 24    [facility] Contracted Facility Disclosure      Plain Language Summary English      [facility] Not Contracted Facility Disclosure      Documents for the Encounter   Medicare Lifetime Reserve Form      Important Medicare Message Printed at Discharge      Advance Beneficiary Notice      Hospital Authorization      [place] Billing Patient Rights Acknowledged 24    Clinical References Attachment   Gastrointestinal Bleeding Discharge Instructions (Venezuelan)   Clinical References Attachment   Bloody Stools Discharge Instructions, Adult (Venezuelan)   Anesthesia Consent (Venezuelan) Signed 24    Discharge Instruction Received 24    Clinical References Attachment   Ferrous Sulfate, ADULT ([place]enezuelan)   Clinical " References Attachment   Pantoprazole, ADULT (Polish)   After Visit Summary   IP AVS     Admission Information    Current Information    Attending at Discharge Admitting Provider Admission Type Admission Status   Simran Haro MD Unis, Graham, MD Critical Care Direct Admit Confirmed Discharge          Admission Date/Time Discharge Date Hospital Service Auth/Cert Status   06/11/24  0206 06/12/24 Hospital Medicine Incomplete          Hospital Area Unit Room/Bed    [facility] MEDICAL SURGICAL UNIT ACUTE K509/K509 A            Discharge Disposition Discharge Destination   Home or Self Care Home or Self Care     Admission    Complaint   Upper GI bleed     Hospital Account    Name Acct ID Class Status Primary Coverage   Rex Dumont [id number] IP- Inpatient Discharged/Not Billed None          Guarantor Account (for Hospital Account #[id number])    Name Relation to Pt Service Area Active? Acct Type   Rex Dumont Self OHSSA Yes Personal/Family   Address Phone     [address] [place] [address] [place] [address] [phone](C)            Coverage Information (for Hospital Account #[id number])    Not on file       Ambulatory referral/consult to Gastroenterology [[id number]] (Order [id number])  Outpatient Referral  Date and Time: 6/12/2024  9:57 AM Department: [facility] Acute Rel By/Authorizing: Simran Haro MD     Linked Results    Procedure Abnormality Status   Ambulatory referral/consult to Gastroenterology         Dept Order Information    Date Department   6/12/2024 [facility]             Order Information    Order Date/Time Release Date/Time Start Date/Time End Date/Time   06/12/24 09:57 AM None 6/12/2024 None           Order Details    Frequency Duration Priority Order Class   None None Routine External Referral       Quantity    Ordering Quantity   1     Quantity    Ordering Quantity Ordering Quantity   1 1         Order Details    Order ID   [id number]                Reprint Order Requisition    Ambulatory referral/consult to Gastroenterology (Order #[id number]) on 24           Future Order Information    Expected Expires      2024                   Associated Diagnoses     ICD-10-CM ICD-9-CM   Upper GI bleed    K92.2 578.9         Ambulatory referral/consult to Gastroenterology: Patient Communication     Not Released  Not seen         Order Questions    Question Answer Comment   Location is [place] or [place]: No    What is the reason for visit? (If this is for a Colonoscopy - STOP and place a Case Request  (NO CLINIC APPT NEEDED) GI Bleed (i.e. blood in stool or vomit)/Anemia Hospital Follow-up   Note: This question is linked to a decision tree used for scheduling when applicable.   Referred to Region: Only select region(s) you would like the patient to be seen in if it is outside of the current encounter's department.  [facility]   Note: Only select region(s) you would like the patient to be seen in if it is outside of the current encounter's department.                      Collection Information            Acknowledgement Info    For At Acknowledged By Acknowledged On   Placing Order 24 0957 Cami Byrne RN 24 1002                   Verbal Order Info    Action Created on Order Mode Entered by Responsible Provider Signed by Signed on   Ordering 24 0957 Verbal with readback Shante Vicente RN Garikepati, Mayuri, MD Garikepati, Mayuri, MD 24 1100           Patient Details  MRN:[id number]  Name Legal Sex:  SSHugh Dimas Male 1995          Address Phone Email [address] [place] [address] Home: [phone]  Work:  Cell: [phone] [email or web address]          PCP Allergies     No, Primary Doctor No Known Allergies                Primary Coverage    Payer Plan Sponsor Code Group Number Group Name   No coverage found                Additional Information    Associated Reports External References   Priority and Order Details [facility] Referral Criteria-Gastroenterology Clinic   Collection Information [facility] Referral Criteria - Gastro Lab Procedure Referral           ADT-Related Order Information         Encounter    View Encounter               Order Provider Info        Office phone Pager E-mail   Ordering User Shante Vicente RN -- -- [email or web address]   Authorizing Provider  Simran Haro MD  [phone] -- [email or web address]   Attending Provider When Ordered  Simran Haro MD  [phone] -- [email or web address]   Entered By Shante Vicente RN -- -- [email or web address]   Ordering Provider  Simran Haro MD  [phone] -- [email or web address] [email or web address]   Signed By (on 06/13/2024 1100)  Simran Haro MD  [phone] -- [email or web address]     Ambulatory referral/consult to Gastroenterology [[id number]]    Electronically signed by: Simran Haro MD on 06/13/24 1100 Status: Active   Mode: Ordering in Verbal with readback mode Communicated by: Shante Vicente RN   Ordering user: Shante Vicente RN 06/12/24 0957 Ordering provider: Simran Haro MD   Authorized by: Simran Haro MD Ordering mode: Verbal with readback   Frequency:  06/12/24 -     Acknowledged: Cami Byrne RN 06/12/24 1002 for Placing Order   Diagnoses  Upper GI bleed [K92.2]     Questionnaire    Question Answer   Location is [place] or [place]: No   What is the reason for visit? (If this is for a Colonoscopy - STOP and place a Case Request  (NO CLINIC APPT NEEDED) GI Bleed (i.e. blood in stool or vomit)/Anemia Comment - Hospital Follow-up   Referred to Region: Only select region(s) you would like the patient to be seen in if it is outside of the current encounter's department.  Comment - [facility]       Order Diagnosis and CPT Display    Order Diagnosis: Upper GI bleed [K92.2 (ICD-10-CM)] CPT:                   Electronically signed by: Shante Vicente RN Lic #  < Not on File > NPI:  < Not on File >   Authorized by: Simran Haro MD Lic # [id number] NPI: [id number]     Shante Vicente RN    [phone]

## 2024-06-17 RX ORDER — AMOXICILLIN 500 MG/1
1000 TABLET, FILM COATED ORAL EVERY 12 HOURS
Qty: 56 TABLET | Refills: 0 | Status: SHIPPED | OUTPATIENT
Start: 2024-06-17 | End: 2024-07-01

## 2024-06-17 RX ORDER — CLARITHROMYCIN 500 MG/1
500 TABLET, FILM COATED ORAL EVERY 12 HOURS
Qty: 28 TABLET | Refills: 0 | Status: SHIPPED | OUTPATIENT
Start: 2024-06-17 | End: 2024-07-01

## 2024-06-17 RX ORDER — PANTOPRAZOLE SODIUM 40 MG/1
40 TABLET, DELAYED RELEASE ORAL 2 TIMES DAILY
Qty: 28 TABLET | Refills: 0 | Status: SHIPPED | OUTPATIENT
Start: 2024-06-17 | End: 2024-07-01

## 2024-06-17 NOTE — PROGRESS NOTES
Biopsies taken during recent EGD positive for H pylori.  Will prescribe a course of antibiotics as well as antacids, all of which to be taken twice a day for full to weeks.  Following completion of regimen a stool test will need to be performed to confirm eradication.